# Patient Record
Sex: FEMALE | Race: WHITE | URBAN - METROPOLITAN AREA
[De-identification: names, ages, dates, MRNs, and addresses within clinical notes are randomized per-mention and may not be internally consistent; named-entity substitution may affect disease eponyms.]

---

## 2018-01-11 ENCOUNTER — HOSPITAL ENCOUNTER (OUTPATIENT)
Dept: LAB | Age: 23
Discharge: HOME | End: 2018-01-11

## 2018-01-11 ENCOUNTER — OFFICE VISIT (OUTPATIENT)
Dept: FAMILY MEDICINE CLINIC | Age: 23
End: 2018-01-11

## 2018-01-11 VITALS
SYSTOLIC BLOOD PRESSURE: 119 MMHG | HEIGHT: 61 IN | BODY MASS INDEX: 31.15 KG/M2 | TEMPERATURE: 98.3 F | WEIGHT: 165 LBS | HEART RATE: 76 BPM | DIASTOLIC BLOOD PRESSURE: 77 MMHG

## 2018-01-11 DIAGNOSIS — N91.1 SECONDARY AMENORRHEA: ICD-10-CM

## 2018-01-11 DIAGNOSIS — L83 AN (ACANTHOSIS NIGRICANS): ICD-10-CM

## 2018-01-11 DIAGNOSIS — E66.3 OVERWEIGHT: ICD-10-CM

## 2018-01-11 DIAGNOSIS — N30.01 ACUTE CYSTITIS WITH HEMATURIA: ICD-10-CM

## 2018-01-11 DIAGNOSIS — Z13.9 ENCOUNTER FOR SCREENING: Primary | ICD-10-CM

## 2018-01-11 DIAGNOSIS — Z97.5 IUD (INTRAUTERINE DEVICE) IN PLACE: ICD-10-CM

## 2018-01-11 DIAGNOSIS — Z23 ENCOUNTER FOR IMMUNIZATION: ICD-10-CM

## 2018-01-11 LAB
BILIRUB UR QL STRIP: NEGATIVE
EST. AVERAGE GLUCOSE BLD GHB EST-MCNC: 105 MG/DL
GLUCOSE UR-MCNC: NEGATIVE MG/DL
HBA1C MFR BLD: 5.3 % (ref 4.2–6.3)
HGB BLD-MCNC: 12.4 G/DL
KETONES P FAST UR STRIP-MCNC: NEGATIVE MG/DL
PH UR STRIP: 6.5 [PH] (ref 4.6–8)
PROT UR QL STRIP: NORMAL
SP GR UR STRIP: 1.01 (ref 1–1.03)
TSH SERPL DL<=0.05 MIU/L-ACNC: 2.21 UIU/ML (ref 0.36–3.74)
UA UROBILINOGEN AMB POC: NORMAL (ref 0.2–1)
URINALYSIS CLARITY POC: NORMAL
URINALYSIS COLOR POC: NORMAL
URINE BLOOD POC: NORMAL
URINE LEUKOCYTES POC: NORMAL
URINE NITRITES POC: NEGATIVE

## 2018-01-11 PROCEDURE — 87077 CULTURE AEROBIC IDENTIFY: CPT | Performed by: PHYSICIAN ASSISTANT

## 2018-01-11 PROCEDURE — 82627 DEHYDROEPIANDROSTERONE: CPT | Performed by: PHYSICIAN ASSISTANT

## 2018-01-11 PROCEDURE — 83036 HEMOGLOBIN GLYCOSYLATED A1C: CPT | Performed by: PHYSICIAN ASSISTANT

## 2018-01-11 PROCEDURE — 87086 URINE CULTURE/COLONY COUNT: CPT | Performed by: PHYSICIAN ASSISTANT

## 2018-01-11 PROCEDURE — 87186 SC STD MICRODIL/AGAR DIL: CPT | Performed by: PHYSICIAN ASSISTANT

## 2018-01-11 PROCEDURE — 84146 ASSAY OF PROLACTIN: CPT | Performed by: PHYSICIAN ASSISTANT

## 2018-01-11 PROCEDURE — 84443 ASSAY THYROID STIM HORMONE: CPT | Performed by: PHYSICIAN ASSISTANT

## 2018-01-11 RX ORDER — CEPHALEXIN 500 MG/1
500 CAPSULE ORAL 3 TIMES DAILY
Qty: 21 CAP | Refills: 0 | Status: SHIPPED | OUTPATIENT
Start: 2018-01-11 | End: 2020-04-20 | Stop reason: ALTCHOICE

## 2018-01-11 NOTE — PROGRESS NOTES
At discharge station AVS was printed and reviewed with pt with David Vides as . Reviewed rx script for  Told pt rx should be ready for  at pharmacy in approximately 2 hrs. Gave vaccine per protocol. Documented in [address]. Gave patient VIS information sheet and reviewed instructions regarding possible adverse side effects and allergic reactions. No adverse reaction noted at time of discharge.  Myles Hickman RN

## 2018-01-11 NOTE — PROGRESS NOTES
Results for orders placed or performed in visit on 01/11/18   AMB POC HEMOGLOBIN (HGB)   Result Value Ref Range    Hemoglobin (POC) 12.4    AMB POC URINALYSIS DIP STICK MANUAL W/O MICRO   Result Value Ref Range    Color (UA POC) Ev     Clarity (UA POC) Cloudy     Glucose (UA POC) Negative Negative    Bilirubin (UA POC) Negative Negative    Ketones (UA POC) Negative Negative    Specific gravity (UA POC) 1.015 1.001 - 1.035    Blood (UA POC) 4+ Negative    pH (UA POC) 6.5 4.6 - 8.0    Protein (UA POC) 2+ Negative    Urobilinogen (UA POC) normal 0.2 - 1    Nitrites (UA POC) Negative Negative    Leukocyte esterase (UA POC) 1+ Negative

## 2018-01-11 NOTE — PATIENT INSTRUCTIONS
Amenorrea secundaria: Instrucciones de cuidado - [ Secondary Amenorrhea: Care Instructions ]  Instrucciones de cuidado    La amenorrea es la falta de períodos menstruales. [address] tipos. La amenorrea primaria significa que nunca ha tenido períodos menstruales. La amenorrea secundaria significa que ha tenido períodos, y luego se interrumpen, especialmente mady más de 3 meses. Aun si no tiene períodos, todavía podría quedar embarazada. Es posible que no sepa por qué se interrumpieron nisha períodos. Las causas posibles incluyen el [place], los cambios hormonales y adelgazar o subir mucho de peso rápidamente. Algunos medicamentos y el estrés también podrían causarla. Participar activamente en deportes de resistencia también puede hacer que no tenga un período o que deje de [place]. Es posible que las atletas traten de bajar de peso o mantenerlo de Kristian vasquez. Estas incluyen hacer dietas demasiado estrictas o tener comilonas seguidas de purgas. Maxim hacer estas cosas puede conducir a trastornos de la alimentación, Rodney Lang y osteoporosis. Si hace menos ejercicio o sube un poco de peso, es probable que nisha períodos se restablezcan. Montelongo médico puede solicitar pruebas para determinar por qué se arredondo detenido nisha períodos. Montelongo médico puede darle la hormona progestina. Puede hacer que tenga montelongo período. Hable con montelongo médico si no tiene un período menstrual mady 3 meses o más. Pasar mucho tiempo sin tener el período puede aumentar las probabilidades de tener cáncer en el revestimiento del útero más adelante. La atención de seguimiento es marko parte clave de montelongo tratamiento y seguridad. Asegúrese de hacer y acudir a todas las citas, y llame a montelongo médico si está teniendo problemas. También es marko buena idea saber los resultados de los exámenes y mantener marko lista de los medicamentos que blue. ¿Cómo puede cuidarse en el hogar?   · Lleve marko Graciella Howard y saludable que contenga frutas, verduras, granos enteros, proteínas y productos lácteos bajos en grasa. · Davis ejercicio suave, a menos que stuart médico le indique que no lo davis. · Use un anticonceptivo si no desea quedar embarazada. ¿Cuándo debes pedir ayuda? Presta especial atención a los cambios en tu chuck y asegúrate de comunicarte con tu médico si:  ? · Piensas que podrías estar embarazada. ? · Tienes sangrado abundante después de no jean pierre tenido tu período mady varios meses. ? · No has tenido tu período menstrual mady 3 meses. ¿Dónde puede encontrar más información en inglés? Shin Deras a http://pranay-esteban.[email or web address] Clotilde Carranza [id number] en la búsqueda para aprender más acerca de \"Amenorrea secundaria: Instrucciones de cuidado - [ Secondary Amenorrhea: Care Instructions ]. \"  Revisado: 13 octubre, 2016  Versión del contenido: 11.4  © 9513-3611 Healthwise, Incorporated. Las instrucciones de cuidado fueron adaptadas bajo licencia por [employer] (which disclaims liability or warranty for this information). Si usted tiene [place] afección médica o sobre estas instrucciones, siempre pregunte a stuart profesional de chuck. Healthwise, Incorporated niega toda garantía o responsabilidad por stuart uso de esta información. Infección urinaria en las mujeres: Instrucciones de cuidado - [ Urinary Tract Infection in Women: Care Instructions ]  Instrucciones de cuidado    Marko infección urinaria (UTI, por insha siglas en inglés) es un término general que hace referencia a marko infección que se produce en cualquier parte entre los riñones y la uretra (conducto por el cual se expulsa la orina). La mayoría de las UTI son infecciones de la vejiga. Con frecuencia, causan dolor o ardor al [facility]. Las UTI son causadas por bacterias y pueden curarse con antibióticos. Asegúrese de completar el tratamiento para que la infección desaparezca. La atención de seguimiento es marko parte clave de stuart tratamiento y seguridad.  Asegúrese de hacer y acudir a todas las citas, y llame a stuart médico si está teniendo problemas. También es marko buena idea saber los resultados de los exámenes y mantener marko lista de los medicamentos que blue. ¿Cómo puede cuidarse en el hogar? · [address] No deje de tomarlos por el hecho de sentirse mejor. Debe belen todos los antibióticos hasta terminarlos. · Marry los próximos christopher o [address], kenia mayor cantidad de [place] y otros líquidos. [place] puede ayudar a eliminar las bacterias que provocan la infección. (Si tiene marko enfermedad de los riñones, el corazón o el hígado y tiene que Dejah's líquidos, hable con stuart médico antes de aumentar stuart consumo). · Evite las bebidas gaseosas o con cafeína. Pueden irritar la vejiga. · Orine con frecuencia. Trate de vaciar la vejiga cada vez que orine. · Para aliviar el dolor, tome un baño caliente o colóquese marko almohadilla térmica a baja temperatura sobre la parte baja del abdomen o la caprice genital. Nunca se duerma mientras [place] marko almohadilla térmica. Para prevenir las infecciones urinarias  · [place] abundante agua todos los días. [place] la ayuda a orinar con frecuencia, lo que elimina las bacterias de stuart organismo. (Si tiene marko enfermedad de los riñones, el corazón o el hígado y tiene que Dejah's líquidos, hable con stuart médico antes de aumentar stuart consumo). · Orine cuando necesite hacerlo. · Orine inmediatamente después de jean pierre tenido [employer]. · Cámbiese las toallas sanitarias con frecuencia. · Evite el uso de lavados vaginales, los jasper de burbujas, los [place] de higiene femenina y otros productos para la higiene femenina que contengan desodorantes. · Después de ir al baño, límpiese de adelante hacia atrás. ¿Cuándo debes pedir ayuda? Llama a tu médico ahora mismo o busca atención médica inmediata si:  ? · Aparecen síntomas carmen fiebre, escalofríos, náuseas o vómitos por Vero Petty, o empeoran.    ? · Te empieza a doler la espalda, heath debajo de la caja torácica. A esto se le llama dolor en el flanco.   ? · Aparece paul o pus en la orina. ? · Tienes problemas con los antibióticos. ?Presta especial atención a los cambios en tu chuck y asegúrate de comunicarte con tu médico si:  ? · No mejoras después de jean pierre tomado un antibiótico mady 2 días. ? · Los síntomas desaparecen y Luda Mcne[place]. ¿Dónde puede encontrar más información en inglés? Juan Carlos Hernandez a http://pranay-esteban.[email or web address] Andreea Knife [id number] en la búsqueda para aprender más acerca de \"Infección urinaria en las mujeres: Instrucciones de cuidado - [ Urinary Tract Infection in Women: Care Instructions ]. \"  Revisado: 12 dang, 2017  Versión del contenido: 11.4  © 1458-5833 Healthwise, Incorporated. Las instrucciones de cuidado fueron adaptadas bajo licencia por [employer] (which disclaims liability or warranty for this information). Si usted tiene [place] afección médica o sobre estas instrucciones, siempre pregunte a montelongo profesional de chuck. Healthwise, Incorporated niega toda garantía o responsabilidad por montelongo uso de esta información. Cómo comenzar un plan para bajar de peso: Instrucciones de cuidado - [ Starting a Weight Loss Plan: Care Instructions ]  Instrucciones de cuidado    Si está pensando en adelgazar, puede que le resulte difícil saber por dónde empezar. Montelongo médico puede ayudarle a preparar un plan para bajar de peso que se ajuste mejor a nisha necesidades. Es posible que prefiera belen marko clase de nutrición o ejercicio, o unirse a un brandan de [de-identified] para adelgazar. Si tiene preguntas sobre cómo cambiar nisha hábitos alimenticios o rutina de ejercicio, pregúntele a montelongo médico sobre la posibilidad de consultar a un dietista registrado o a un especialista en ejercicio. Bajar de peso puede ser un gran desafío. No tiene que hacer grandes cambios de repente. Jarvis Colon y Sukh.  Cuando esos cambios se conviertan en un hábito, incorpore algunos [employer]. Si eloise que no está listo para hacer cambios en daniel momento, escoja marko fecha en el futuro. Davis marko storm con stuart médico para determinar si es apropiado que comience un plan para bajar de [place]. La atención de seguimiento es marko parte clave de stuart tratamiento y seguridad. Asegúrese de hacer y acudir a todas las citas, y llame a stuart médico si está teniendo problemas. También es marko buena idea saber los resultados de los exámenes y mantener marko lista de los medicamentos que blue. ¿Cómo puede cuidarse en el hogar? · Establezca metas realistas. Muchas personas esperan perder más peso del que es probable. Perder el 5% a 10% del peso corporal podría ser suficiente para mejorar stuart chuck. · Davis que stuart ritu y nisha amigos se involucren para brindarle apoyo. Hable con ellos sobre las razones por las que Ed Burows y [address] Pueden ayudarle si participan en nisha rutinas de ejercicio y comen con usted, aunque es posible que coman algo diferente. · Busque lo que funcione mejor para usted. Si no tiene tiempo o no le gusta cocinar, un programa que ofrezca barras o batidos carmen sustitutos de comida podría ser el más adecuado para usted. Maxim si le gusta cocinar, lo mejor puede ser un plan que incluya menús y recetas diarios. · Pregúntele a stuart médico acerca de otros profesionales de la chuck que puedan ayudarle a alcanzar nisha objetivos para bajar de [place]. ¨ Un dietista puede ayudarle a introducir cambios saludables en stuart dieta. ¨ Un especialista en ejercicio o entrenador personal pueden ayudarle a desarrollar un programa de ejercicio Lafonda Lesch y hank. ¨ Un consejero o psiquiatra puede ayudarle a lidiar con la depresión, la ansiedad u otros problemas familiares que puedan interponerse en stuart propósito para adelgazar. · Considere unirse a un brandan de [de-identified] de personas que tratan de adelgazar. Stuart médico puede recomendarle grupos de apoyo en stuart localidad.   ¿Dónde puede encontrar más información en inglés? Shin Deras a http://pranay-esteban.info/. Clotilde Jamesy [id number] en la búsqueda para aprender más acerca de \"Cómo comenzar un plan para bajar de peso: Instrucciones de cuidado - [ Starting a Weight Loss Plan: Care Instructions ]. \"  Revisado: 13 octubre, 2016  Versión del contenido: 11.4  © 2492-3353 Healthwise, Incorporated. Las instrucciones de cuidado fueron adaptadas bajo licencia por [employer] (which disclaims liability or warranty for this information). Si usted tiene [place] afección médica o sobre estas instrucciones, siempre pregunte a stuart profesional de chuck. Healthwise, Incorporated niega toda garantía o responsabilidad por stuart uso de esta información.

## 2018-01-11 NOTE — PROGRESS NOTES
Assessment/Plan:    Diagnoses and all orders for this visit:    1. Encounter for screening  -     AMB POC HEMOGLOBIN (HGB)  -     AMB POC URINALYSIS DIP STICK MANUAL W/O MICRO    2. Acute cystitis with hematuria  -     cephALEXin (KEFLEX) 500 mg capsule; Take 1 Cap by mouth three (3) times daily. [place] 1 pastilla 3 veces al duncan por stuart orinar  -     CULTURE, URINE; Future    3. Overweight  -     HEMOGLOBIN A1C WITH EAG; Future  -     TSH 3RD GENERATION; Future  -     REFERRAL TO NUTRITION    4. Secondary amenorrhea  -     HEMOGLOBIN A1C WITH EAG; Future  -     TSH 3RD GENERATION; Future  -     PROLACTIN; Future  -     DHEA SULFATE; Future    5. IUD (intrauterine device) in place    6. AN (acanthosis nigricans)  -     HEMOGLOBIN A1C WITH EAG; Future    35 lb wt gain may be contributing to her amenorrhea  No hx of GDM but mom has DM and she has distinct acanthosis nigricans  Encouraged lifestyle changes immediately, will call if results abnl      Follow-up Disposition:  Return in about 4 weeks (around 2/8/2018). RACHEL Mensah expressed understanding of this plan. An AVS was printed and given to the patient.      ----------------------------------------------------------------------    Chief Complaint   Patient presents with    Headache     headaches 3-4x a week since 8 months ago, feeling dizzy as well    Vaginal Bleeding     spotting since placement of 10 year/copper IUD, in Dec. 2016    Urinary Burning     burning upon urination started about 6 months ago, also complaining of \"bad smell\"       History of Present Illness:  New pt here for 1. No period since 2016. She had a baby in 10/16. She is not breastfeeding. She delivered at [facility] and then had a copper T IUD (which does not have embedded hormones like a mirena, unless she is confused about the type of IUD. She does report that it is good for 10 years which is consistent with the copper T).  She has gained 35 lbs since pre-pregnancy weight of 130lbs. She has developed hair growth of dark, thick hair on her nipples/chest and face that is new since she started to gain weight. 2. Stress of being home w/out a car and having few outlets - she has 2 children here 3 and 2 yo. She prefers to work outside of the house. Her 10 yo is in another country and she feels stressed about this. Her  is supportive and there is no DV  3. Dysuria with burning with urination and no fever on/off for 6 months. Today, she has burning     No past medical history on file. Current Outpatient Prescriptions   Medication Sig Dispense Refill    cephALEXin (KEFLEX) 500 mg capsule Take 1 Cap by mouth three (3) times daily. [place] 1 pastilla 3 veces al duncan por stuart orinar 21 Cap 0       No Known Allergies    Social History   Substance Use Topics    Smoking status: Never Smoker    Smokeless tobacco: Never Used    Alcohol use No       No family history on file.     Physical Exam:     Visit Vitals    /77 (BP 1 Location: Right arm, BP Patient Position: Sitting)    Pulse 76    Temp 98.3 °F (36.8 °C) (Oral)    Ht 5' 0.63\" (1.54 m)    Wt 165 lb (74.8 kg)    LMP 12/01/2016  Comment: SPOTTING SINCE PLACEMENT OF IUD    BMI 31.56 kg/m2       A&Ox3  WDWN NAD  Respirations normal and non labored  Results for orders placed or performed in visit on 01/11/18   AMB POC URINALYSIS DIP STICK MANUAL W/O MICRO     Status: None   Result Value Ref Range Status    Color (UA POC) Ev  Final    Clarity (UA POC) Cloudy  Final    Glucose (UA POC) Negative Negative Final    Bilirubin (UA POC) Negative Negative Final    Ketones (UA POC) Negative Negative Final    Specific gravity (UA POC) 1.015 1.001 - 1.035 Final    Blood (UA POC) 4+ Negative Final    pH (UA POC) 6.5 4.6 - 8.0 Final    Protein (UA POC) 2+ Negative Final    Urobilinogen (UA POC) normal 0.2 - 1 Final    Nitrites (UA POC) Negative Negative Final    Leukocyte esterase (UA POC) 1+ Negative Final

## 2018-01-11 NOTE — MR AVS SNAPSHOT
Visit Information Js Anderson y Irsihcorona Personal Médico Departamento Teléfono del Dep. Número de visita 1/11/2018 10:00 AM Martina Thurman 104, 1554 Surgeons  [id number] Follow-up Instructions Return in about 4 weeks (around 2/8/2018). Upcoming Health Maintenance Date Due  
 HPV AGE 9Y-34Y (1 of 3 - Female 3 Dose Series) 10/2/2004 DTaP/Tdap/Td series (1 - Tdap) 10/2/2014 PAP AKA CERVICAL CYTOLOGY 10/2/2014 Influenza Age 5 to Adult 8/1/2017 Alergias  Review Complete El: 1/11/2018 Por: Regina Ge RN  
 A partir del:  1/11/2018 No Known Allergies Vacunas actuales Starleen Gavia No hay ninguna vacuna archivada. No revisadas esta visita You Were Diagnosed With   
  
 Galo Madrigal Encounter for screening    -  Primary ICD-10-CM: Z13.9 ICD-9-CM: V82.9 Acute cystitis with hematuria     ICD-10-CM: N30.01 
ICD-9-CM: 595.0 Overweight     ICD-10-CM: C63.8 ICD-9-CM: 278.02 Secondary amenorrhea     ICD-10-CM: N91.1 ICD-9-CM: 626.0 IUD (intrauterine device) in place     ICD-10-CM: Z97.5 ICD-9-CM: V45.51   
 AN (acanthosis nigricans)     ICD-10-CM: L83 
ICD-9-CM: 701.2 Partes vitales PS Pulso Temperatura [place] ( percentil de crecimiento) Peso (percentil de crecimiento) LMP (última michael)  
 119/77 (BP 1 Location: Right arm, BP Patient Position: Sitting) 76 98.3 °F (36.8 °C) (Oral) 5' 0.63\" (1.54 m) 165 lb (74.8 kg) 12/01/2016 BMI [facility]) Estado obstétrico Estatus de tabaquísmo 31.56 kg/m2 IUD Never Smoker Historial de signos vitales BMI and BSA Data Body Mass Index Body Surface Area  
 31.56 kg/m 2 1.79 m 2 [facility] Name Phone [address], [address] [place] [facility] [phone] Montelongo lista de medicamentos actualizada Lista actualizada el: 1/11/18 12:03 PM.  Gloriajean Fast use montelongo lista de medicamentos más reciente. cephALEXin 500 mg capsule También conocido carmen:  Rolin Bonus Take 1 Cap by mouth three (3) times daily. Merkel 1 pastilla 3 veces al duncan por stuart orinar Recetas Enviado a la [place] Refills  
 cephALEXin (KEFLEX) 500 mg capsule 0 Sig: Take 1 Cap by mouth three (3) times daily. Merkel 1 pastilla 3 veces al duncan por stuart orinar Class: Normal  
 Pharmacy: [facility] [address], [address] [facility]: [phone] Route: Oral  
  
Hicimos lo siguiente AMB POC HEMOGLOBIN (HGB) [56975 CPT(R)] AMB POC URINALYSIS DIP STICK MANUAL W/O MICRO [15712 CPT(R)] REFERRAL TO NUTRITION [REF50 Custom] Instrucciones de seguimiento Return in about 4 weeks (around 2/8/2018). Informacion de [employer] de Referencia Referido por Referido a  
  
 [id number] CHANDAN ROBLES No disponible [facility]as Estado [facility] [facility]  
 1 New Request 1/11/18 1/11/19 Si stuart referencia tiene un estado de \"pending review\" o \"denied\" , informacion adicional sera enviada para apoyar el resultado de esta decision. Instrucciones para el Paciente Amenorrea secundaria: Instrucciones de cuidado - [ Secondary Amenorrhea: Care Instructions ] Instrucciones de cuidado La amenorrea es la falta de períodos menstruales. [address] tipos. La amenorrea primaria significa que nunca ha tenido períodos menstruales. La amenorrea secundaria significa que ha tenido períodos, y luego se interrumpen, especialmente mady más de 3 meses. Aun si no tiene períodos, todavía podría quedar embarazada. Es posible que no sepa por qué se interrumpieron nisha períodos. Las causas posibles incluyen el [place], los cambios hormonales y adelgazar o subir mucho de peso rápidamente. Algunos medicamentos y el estrés también podrían causarla.  
Participar activamente en deportes de resistencia también puede hacer que no tenga un período o que deje de [place]. Es posible que las atletas traten de bajar de peso o mantenerlo de Kristian vasquez. Estas incluyen hacer dietas demasiado estrictas o tener comilonas seguidas de purgas. Maxim hacer estas cosas puede conducir a trastornos de la alimentación, Dorthey Devine y osteoporosis. Si hace menos ejercicio o sube un poco de peso, es probable que nisha períodos se restablezcan. Montelongo médico puede solicitar pruebas para determinar por qué se arredondo detenido nisha períodos. Montelongo médico puede darle la hormona progestina. Puede hacer que tenga montelongo período. Hable con montelongo médico si no tiene un período menstrual mady 3 meses o más. Pasar mucho tiempo sin tener el período puede aumentar las probabilidades de tener cáncer en el revestimiento del útero más adelante. La atención de seguimiento es marko parte clave de montelongo tratamiento y seguridad. Asegúrese de hacer y acudir a todas las citas, y llame a montelongo médico si está teniendo problemas. También es marko buena idea saber los resultados de los exámenes y mantener marko lista de los medicamentos que blue. Cómo puede cuidarse en el hogar? · Lleve marko Arvis Prost y saludable que contenga frutas, verduras, granos enteros, proteínas y productos lácteos bajos en grasa. · Norm ejercicio suave, a menos que montelongo médico le indique que no lo norm. · Use un anticonceptivo si no desea quedar embarazada. Cuándo debes pedir ayuda? Presta especial atención a los cambios en tu chuck y asegúrate de comunicarte con tu médico si: 
? · Piensas que podrías estar embarazada. ? · Tienes sangrado abundante después de no jean pierre tenido tu período mady varios meses. ? · No has tenido tu período menstrual mady 3 meses. Dónde puede encontrar más información en inglés? Eliza Alvarez a [email or web address]  
Jean  H402 en la búsqueda para aprender más acerca de \"Amenorrea secundaria: Instrucciones de cuidado - [ Secondary Amenorrhea: Care Instructions ]. \" 
Revisado: 13 octubre, 2016 Versión del contenido: 11.4 © 9593-3218 Healthwise, Incorporated. Las instrucciones de cuidado fueron adaptadas bajo licencia por Good Help Connections (which disclaims liability or warranty for this information). Si usted tiene Scenic Roper afección médica o sobre estas instrucciones, siempre pregunte a stuart profesional de chuck. Healthwise, Incorporated niega toda garantía o responsabilidad por stuart uso de esta información. Infección urinaria en las mujeres: Instrucciones de cuidado - [ Urinary Tract Infection in Women: Care Instructions ] Instrucciones de cuidado Marko infección urinaria (UTI, por nisha siglas en inglés) es un término general que hace referencia a marko infección que se produce en cualquier parte entre los riñones y la uretra (conducto por el cual se expulsa la orina). La mayoría de las UTI son infecciones de la vejiga. Con frecuencia, causan dolor o ardor al [place]. Las UTI son causadas por bacterias y pueden curarse con antibióticos. Asegúrese de completar el tratamiento para que la infección desaparezca. La atención de seguimiento es marko parte clave de stuart tratamiento y seguridad. Asegúrese de hacer y acudir a todas las citas, y llame a stuart médico si está teniendo problemas. También es marko buena idea saber los resultados de los exámenes y mantener marko lista de los medicamentos que blue. Cómo puede cuidarse en el hogar? · [address] No deje de tomarlos por el hecho de sentirse mejor. Debe belen todos los antibióticos hasta terminarlos. · Marry los próximos christopher o [address], crystal mayor cantidad de [place] y otros líquidos. [place] puede ayudar a eliminar las bacterias que provocan la infección. (Si tiene marko enfermedad de los riñones, el corazón o el hígado y tiene que Weinert's líquidos, hable con stuart médico antes de aumentar stuart consumo). · Evite las bebidas gaseosas o con cafeína. Pueden irritar la vejiga. · Orine con frecuencia. Trate de vaciar la vejiga cada vez que orine. · Para aliviar el dolor, tome un baño caliente o colóquese marko almohadilla térmica a baja temperatura sobre la parte baja del abdomen o la caprice genital. Nunca se duerma mientras [place] marko almohadilla térmica. [address] infecciones urinarias · [place] abundante agua todos los días. [place] la ayuda a orinar con frecuencia, lo que elimina las bacterias de stuart organismo. (Si tiene marko enfermedad de los riñones, el corazón o el hígado y tiene que Dejah's líquidos, hable con stuart médico antes de aumentar stuart consumo). · Orine cuando necesite hacerlo. · Orine inmediatamente después de jean pierre tenido [employer]. · Cámbiese las toallas sanitarias con frecuencia. · Evite el uso de lavados vaginales, los jasper de burbujas, los [place]ter[place] de higiene femenina y otros productos para la higiene femenina que contengan desodorantes. · Después de ir al baño, límpiese de adelante hacia atrás. Cuándo debes pedir ayuda? Llama a tu médico ahora mismo o busca atención médica inmediata si: 
? · Aparecen síntomas carmen fiebre, escalofríos, náuseas o vómitos por Maria De Jesus Finders, o empeoran. ? · Te empieza a doler la espalda, heath debajo de la caja torácica. A esto se le llama dolor en el flanco.  
? · Aparece paul o pus en la orina. ? · Tienes problemas con los antibióticos. ?Presta especial atención a los cambios en tu chuck y asegúrate de comunicarte con tu médico si: 
? · No mejoras después de jean pierre tomado un antibiótico mady 2 días. ? · Los síntomas desaparecen y Ken Simpers. Dónde puede encontrar más información en inglés? Bernice Galeazzi a [email or web address] Rubye Matter H522 en la búsqueda para aprender más acerca de \"Infección urinaria en las mujeres: Instrucciones de cuidado - [ Urinary Tract Infection in Women: Care Instructions ]. \" Revisado: 12 [place], 2017 Versión del contenido: 11.4 © 1907-6535 Healthwise, Incorporated. Las instrucciones de cuidado fueron adaptadas bajo licencia por Good Help Connections (which disclaims liability or warranty for this information). Si usted tiene [place] afección médica o sobre estas instrucciones, siempre pregunte a stuart profesional de cuhck. Healthwise, Incorporated niega toda garantía o responsabilidad por stuart uso de esta información. Cómo comenzar un plan para bajar de peso: Instrucciones de cuidado - [ Starting a Weight Loss Plan: Care Instructions ] Instrucciones de cuidado Si está pensando en adelgazar, puede que le resulte difícil saber por dónde empezar. Stuart médico puede ayudarle a preparar un plan para bajar de peso que se ajuste mejor a nisha necesidades. Es posible que prefiera belen marko clase de nutrición o ejercicio, o unirse a un brandan de [de-identified] para adelgazar. Si tiene preguntas sobre cómo cambiar nisha hábitos alimenticios o rutina de ejercicio, pregúntele a stuart médico sobre la posibilidad de consultar a un dietista registrado o a un especialista en ejercicio. Bajar de peso puede ser un gran desafío. No tiene que hacer grandes cambios de repente. Alean Sharron y Sukh. Cuando esos cambios se conviertan en un hábito, incorpore algunos [employer]. Si eloise que no está listo para hacer cambios en daniel momento, escoja marko fecha en el futuro. Davis marko storm con stuart médico para determinar si es apropiado que comience un plan para bajar de [place]. La atención de seguimiento es marko parte clave de stuart tratamiento y seguridad. Asegúrese de hacer y acudir a todas las citas, y llame a stuart médico si está teniendo problemas. También es marko buena idea saber los resultados de los exámenes y mantener marko lista de los medicamentos que blue. Cómo puede cuidarse en el hogar? · Establezca metas realistas.  Muchas personas esperan perder [employer] del que es probable. Perder el 5% a 10% del peso corporal podría ser suficiente para mejorar stuart chuck. · Davis que stuart ritu y nisha amigos se involucren para brindarle apoyo. Hable con ellos sobre las razones por las que Abida Mariely y [address] Pueden ayudarle si participan en nisha rutinas de ejercicio y comen con usted, aunque es posible que coman algo diferente. · Busque lo que funcione mejor para usted. Si no tiene tiempo o no le gusta cocinar, un programa que ofrezca barras o batidos carmen sustitutos de comida podría ser el más adecuado para usted. Maxim si le gusta cocinar, lo mejor puede ser un plan que incluya menús y recetas diarios. · Pregúntele a stuart médico acerca de otros profesionales de la chuck que puedan ayudarle a alcanzar nisha objetivos para bajar de [place]. ¨ Un dietista puede ayudarle a introducir cambios saludables en stuart dieta. ¨ Un especialista en ejercicio o entrenador personal pueden ayudarle a desarrollar un programa de ejercicio Vamsi Gist y seguro. ¨ Un consejero o psiquiatra puede ayudarle a lidiar con la depresión, la ansiedad u otros problemas familiares que puedan interponerse en stuart propósito para adelgazar. · Considere unirse a un brandan de [de-identified] de personas que tratan de adelgazar. Stuart médico puede recomendarle grupos de apoyo en stuart localidad. Dónde puede encontrar más información en inglés? Jeffrey Wyman a [email or web address]-esteban.[email or web address] Carey Crane G744 en la búsqueda para aprender más acerca de \"Cómo comenzar un plan para bajar de peso: Instrucciones de cuidado - [ Starting a Weight Loss Plan: Care Instructions ]. \" 
Revisado: 13 octubre, 2016 Versión del contenido: 11.4 © 5089-6408 Healthwise, [employer]. Las instrucciones de cuidado fueron adaptadas bajo licencia por Good Help Connections (which disclaims liability or warranty for this information).  Si usted tiene [place] afección médica o sobre estas instrucciones, siempre pregunte a stuart profesional de chuck. [facility], Incorporated niega toda garantía o responsabilidad por stuart uso de esta información. Introducing [facility]! [facility] introduce portal paciente MyChart . Ahora se puede acceder a partes de stuart expediente médico, enviar por correo electrónico la oficina de stuart médico y solicitar renovaciones de medicamentos en línea. En stuart navegador de Internet , Jennifer Drilling a [email or web address] Norm clic en el usuario por Bernadette Radha? Saintjohnathan Hoe clic aquí en la sesión Mundo Honey. Verá la página de registro Constantine. Ingrese stuart código de [facility] avril y carmen aparece a continuación. Usted no tendrá que [employer] código después de jean pierre completado el proceso de registro . Si usted no se inscribe antes de la fecha de caducidad , debe solicitar un nuevo código. · MyChart Código de acceso : Lacey Watts Expires: 4/11/2018 12:03 PM 
 
Ingresa los últimos cuatro dígitos de stuart Número de Seguro Social ( xxxx ) y fecha de nacimiento ( dd / mm / aaaa ) carmen se indica y norm clic en Enviar. Usted será llevado a la siguiente página de registro . Crear un ID MyChart . Esta será stuart ID de inicio de sesión de MyChart y no puede ser [place] , por lo que pensar en marko que es Marisue Daubs y fácil de recordar . Crear marko contraseña MyChart . Usted puede cambiar stuart contraseña en cualquier momento . Ingrese stuart Password Reset de preguntas y Nicole . [place] se puede utilizar en un momento posterior si usted olvida stuart contraseña. Introduzca stuart dirección de correo electrónico . Radha Red recibirá marko notificación por correo electrónico cuando la nueva información está disponible en MyChart . Aaron Brilliant cljessica en Registrarse. Meryle Leach zenobia y descargar porciones de stuart expediente médico. 
Norm clic en el enlace de descarga del menú Resumen para descargar marko copia portátil de stuart información médica . Si tiene Bhupendra [employer] , por favor visite la sección de preguntas frecuentes del sitio web MyChart . Recuerde, MyChart NO es que se utilizará para las necesidades urgentes. Para emergencias médicas , llame al 911 . Ahora disponible en stuart iPhone y Android ! Por favor proporcione daniel resumen de la documentación de cuidado a stuart próximo proveedor. If you have any questions after today's visit, please call [phone].

## 2018-01-12 LAB — PROLACTIN SERPL-MCNC: 12.6 NG/ML

## 2018-01-13 LAB
BACTERIA SPEC CULT: ABNORMAL
CC UR VC: ABNORMAL
DHEA-S SERPL-MCNC: 182.1 UG/DL (ref 110–431.7)
SERVICE CMNT-IMP: ABNORMAL

## 2018-01-18 ENCOUNTER — OFFICE VISIT (OUTPATIENT)
Dept: FAMILY MEDICINE CLINIC | Age: 23
End: 2018-01-18

## 2018-01-18 ENCOUNTER — CLINICAL SUPPORT (OUTPATIENT)
Dept: FAMILY MEDICINE CLINIC | Age: 23
End: 2018-01-18

## 2018-01-18 DIAGNOSIS — Z71.3 DIETARY COUNSELING AND SURVEILLANCE: Primary | ICD-10-CM

## 2018-01-18 DIAGNOSIS — Z71.9 COUNSELED BY NURSE: Primary | ICD-10-CM

## 2018-01-18 NOTE — PROGRESS NOTES
The pt is here for NV for lab results. The only lab resulted at this time is the Urine Culture. The pt was told she does have a UTI, and the Antibiotic she is taking was the correct one for her to take. The pt was asked if she had gotten her rx from the Pharmacy for her UTI. She stated she had. The pt was told her labs appeared to be normal, but the provider had not been able to review them at this time. The provider is not at the clinic today. The pt was told after the provider reviewed them and if there was any abnormalities she would be contacted.  Saskia Vincent RN

## 2018-01-18 NOTE — PROGRESS NOTES
Ms. Yfn Mojica was referred to RD for wt loss nutrition education. Nerissa Martelllevi is interpreting for this appt. Current weight 165 lbs  BMI 31.5 classifying pt as obesity class 1  IBW per pt is 130 lbs; She has not made any changes to diet/lifestyle since meeting with provider  Ms. Yfn Mojica asks RD to prescribe a medication that will help with weight loss stating, \"whenever I diet my mind and my stomach tell me to eat more. \"  Not currently exercising. Meals eaten and prepared at home  24 hour recall  AM  Cereal, sweetened, bright colors  Milk, 2%  Water    Mid-day  Orange, 1  Banana, 1    PM  Egg, 3  Sauce, green made with tomato  Tortilla, corn-8 each  Water    RD discussed with pt that what we talk about is more lifestyle change and not a diet, that implies a beginning and an end. Choosing a variety whole foods and eating smaller portions should not make you feel deprived but rather stronger and give you more energy. Introduced the [employer] and emphasized that ideally 3 meals/day would mirror this plan, but asked if she could start with just one meal/day looking like the MyPlate example. Discussed different meal ideas that would fit into the model. RD suggested having a shake mid-day that is made with water or milk, 2 veggies and a fruit and maybe a handful of nuts. Pt indicated good understanding and willingness to try.    F/U in 1 month to check compliance and discuss adding in exercise

## 2018-03-21 ENCOUNTER — OFFICE VISIT (OUTPATIENT)
Dept: FAMILY MEDICINE CLINIC | Age: 23
End: 2018-03-21

## 2018-03-21 ENCOUNTER — HOSPITAL ENCOUNTER (OUTPATIENT)
Dept: LAB | Age: 23
Discharge: HOME | End: 2018-03-21

## 2018-03-21 VITALS
SYSTOLIC BLOOD PRESSURE: 120 MMHG | TEMPERATURE: 98 F | BODY MASS INDEX: 31.94 KG/M2 | DIASTOLIC BLOOD PRESSURE: 75 MMHG | HEART RATE: 82 BPM | WEIGHT: 167 LBS

## 2018-03-21 DIAGNOSIS — N93.8 DUB (DYSFUNCTIONAL UTERINE BLEEDING): Primary | ICD-10-CM

## 2018-03-21 DIAGNOSIS — Z01.419 ENCOUNTER FOR WELL WOMAN EXAM: ICD-10-CM

## 2018-03-21 DIAGNOSIS — B96.89 BV (BACTERIAL VAGINOSIS): ICD-10-CM

## 2018-03-21 DIAGNOSIS — Z30.431 ENCOUNTER FOR ROUTINE CHECKING OF INTRAUTERINE CONTRACEPTIVE DEVICE (IUD): ICD-10-CM

## 2018-03-21 DIAGNOSIS — N76.0 BV (BACTERIAL VAGINOSIS): ICD-10-CM

## 2018-03-21 PROCEDURE — 88142 CYTOPATH C/V THIN LAYER: CPT | Performed by: PHYSICIAN ASSISTANT

## 2018-03-21 RX ORDER — METRONIDAZOLE 500 MG/1
500 TABLET ORAL 2 TIMES DAILY
Qty: 14 TAB | Refills: 0 | Status: SHIPPED | OUTPATIENT
Start: 2018-03-21 | End: 2020-04-20 | Stop reason: ALTCHOICE

## 2018-03-21 NOTE — PROGRESS NOTES
Assessment/Plan:    Diagnoses and all orders for this visit:    1. DUB (dysfunctional uterine bleeding)    2. Encounter for routine checking of intrauterine contraceptive device (IUD)    3. Encounter for well woman exam  -     PAP, LB, RFX HPV OBUTT([id number]); Future- this is the wrong pap order, please switch it with PAP w/out HPV    4. BV (bacterial vaginosis)  -     metroNIDAZOLE (FLAGYL) 500 mg tablet; Take 1 Tab by mouth two (2) times a day. [place] 1 Nereida-RupaloRawendy [employer] veces al duncan        Follow-up Disposition:  Return in about 1 month (around 2018). Atiya Credit McFerren, PA-C Verner Deems expressed understanding of this plan. An AVS was printed and given to the patient.      ----------------------------------------------------------------------    Chief Complaint   Patient presents with    Well Woman       History of Present Illness:    22 yo   Here for abnormal menstrual bleeding since insertion of IUD in 10/16. She has had spotting daily. She needs to use a panty liner daily  She has no pain with intercourse or pelvic pain  The daily spotting is very inconvenient to her. She is not sure what form of IUD she has but it is good for 10 years she states  She has a 3 yo and a 3 yo, she is not desiring of another pregnancy anytime soon     No past medical history on file. Current Outpatient Prescriptions   Medication Sig Dispense Refill    metroNIDAZOLE (FLAGYL) 500 mg tablet Take 1 Tab by mouth two (2) times a day. [place] 1 pastilla dos veces al duncan 14 Tab 0    cephALEXin (KEFLEX) 500 mg capsule Take 1 Cap by mouth three (3) times daily. [place] 1 pastilla 3 veces al duncan por stuart orinar 21 Cap 0       No Known Allergies    Social History   Substance Use Topics    Smoking status: Never Smoker    Smokeless tobacco: Never Used    Alcohol use No       No family history on file.     Physical Exam:     Visit Vitals    /75 (BP 1 Location: Left arm, BP Patient Position: Sitting)    Pulse 82    Temp 98 °F (36.7 °C) (Oral)    Wt 167 lb (75.8 kg)    BMI 31.94 kg/m2       A&Ox3  WDWN NAD  Respirations normal and non labored  Pelvic exam- ext she has menstrual blood (not spotting but like a period), she has blood from os, she has + whiff test, IUD strings present.  Uterus and adnexal exam neg

## 2018-03-21 NOTE — PROGRESS NOTES
Patient seen for discharge with assistance from abril Joseph. We reviewed the AVS, prescription,pharmacy location and the printed Good Rx coupon for the prescription. Per the provider's instruction, the patient needs an [address] appointment for removal of her current IUD and placement of a Mirena IUD which needs to be ordered through PAP. The patient has been to registration to schedule a Financial Screening appointment for [address]. She was given a PAP application and understands to return it as soon as possible to a [facility] clinic. She stated that only her  is working and he is paid in cash. She understands that a notarized letter on letterhead from her 's employer that explains how much and how often he is paid is necessary for the PAP application.  Amrita Weldon RN

## 2018-03-21 NOTE — PATIENT INSTRUCTIONS
Vaginosis bacteriana: Instrucciones de cuidado - [ Bacterial Vaginosis: Care Instructions ]  Instrucciones de cuidado    La vaginosis bacteriana es un tipo de infección vaginal. Es causada por el crecimiento excesivo de ciertas bacterias que se encuentran normalmente en la vagina. Entre los síntomas se incluyen comezón, hinchazón, dolor al orinar o tener relaciones sexuales, y flujo grisáceo o amarillento con olor a pescado. No se considera marko infección que se transmita por contacto sexual.  Aunque los síntomas pueden ser molestos e incómodos, la vaginosis bacteriana no suele causar otros problemas de [facility]. Sin embargo, puede causar complicaciones si la tiene mientras está [place]. Si lazaro la infección podría desaparecer por sí kei, la mayoría de los médicos utilizan antibióticos para stuart tratamiento. Es posible que le hayan recetado pastillas o cremas vaginales. Con tratamiento, la vaginosis bacteriana suele desaparecer al cabo de 5 a 7 días. La atención de seguimiento es marko parte clave de stuart tratamiento y seguridad. Asegúrese de hacer y acudir a todas las citas, y llame a stuart médico si está teniendo problemas. También es marko buena idea saber los resultados de los exámenes y mantener marko lista de los medicamentos que blue. ¿Cómo puede cuidarse en el hogar? · [address] No deje de tomarlos por el hecho de sentirse mejor. Debe belen todos los antibióticos hasta terminarlos. · Si está tomando metronidazol (Flagyl), no coma ni crystal nada que contenga alcohol. · Siga utilizando los medicamentos aunque comience el período menstrual. Utilice toallas sanitarias en lugar de tampones mady el tiempo que utilice la crema vaginal o supositorios. Los tampones pueden absorber el medicamento. · Use ropa holgada de algodón. No utilice nylon ni otros materiales que conserven el calor corporal y la humedad cerca de la piel. · No se rasque.  Alivie la comezón con marko compresa fría o un baño frío. · No se lave la caprice vaginal más de marko vez al día. Use solo agua corriente o un [place] y sin perfume. No use lavados vaginales. ¿Cuándo debe pedir ayuda? Preste especial atención a los cambios en stuart chuck y asegúrese de comunicarse con stuart médico si:  ? · Tiene sangrado vaginal inesperado. ? · Tiene fiebre. ? · Tiene mayor dolor o dolor nuevo en la vagina o la pelvis. ? · No mejora después de 1 semana. ? · Justine síntomas regresan después de que termina justine medicamentos. ¿Dónde puede encontrar más información en inglés? Chloe Officer a [email or web address]-esteban.[email or web address] Dia Eduardo [id number] en la búsqueda para aprender más acerca de \"Vaginosis bacteriana: Instrucciones de cuidado - [ Bacterial Vaginosis: Care Instructions ]. \"  Revisado: 13 octubre, 2016  Versión del contenido: 11.4  © 1750-3484 Healthwise, Incorporated. Las instrucciones de cuidado fueron adaptadas bajo licencia por [employer] (which disclaims liability or warranty for this information). Si usted tiene [place] afección médica o sobre estas instrucciones, siempre pregunte a stuart profesional de chuck. Healthwise, Incorporated niega toda garantía o responsabilidad por stuart uso de esta información. Sangrado uterino anormal: Instrucciones de cuidado - [ Abnormal Uterine Bleeding: Care Instructions ]  Instrucciones de cuidado    El sangrado uterino anormal (AUB, por justine siglas en inglés) es un sangrado irregular del útero que dura más o es más intenso de lo normal o que no ocurre en el momento habitual para usted. A veces, se debe a cambios en los niveles hormonales. También puede estar causado por crecimientos en el útero, tales carmen fibromas o pólipos. A veces no se puede encontrar la causa. Podría tener mucho sangrado cuando no está esperando stuart período. Tsuart médico puede sugerirle marko prueba de embarazo si eloise que está [place].   Lamount Caper de seguimiento es marko parte clave de stuart tratamiento y seguridad. Asegúrese de hacer y acudir a todas las citas, y llame a stuart médico si está teniendo problemas. También es marko buena idea saber los resultados de los exámenes y mantener marko lista de los medicamentos que blue. ¿Cómo puede cuidarse en el hogar? · Sea darrian con los medicamentos. [place] los analgésicos (medicamentos para el dolor) exactamente carmen le fueron indicados. ¨ Si el médico le recetó un analgésico, tómelo según las indicaciones. ¨ Si no está tomando un analgésico recetado, pregúntele a stuart médico si puede belen christopher de [employer]. · Podría faltarle paradise por la pérdida de paul. Coma marko dieta equilibrada con alto contenido de paradise y vitamina C. Entre los alimentos ricos en paradise se encuentran las loyd aquino, los [place], los [place], los frijoles (habichuelas) y las verduras de hojas verdes. Pregúntele a stuart médico si necesita belen pastillas de paradise o un multivitamínico.  ¿Cuándo debe pedir ayuda? Llame al 911 en cualquier momento que considere que necesita atención de [place]. Por ejemplo, llame si:  ? · Se desmayó (perdió el conocimiento). ?Llame a stuart médico ahora mismo o busque atención médica inmediata si:  ? · Tiene dolor repentino e intenso en el abdomen o la pelvis. ? · Tiene sangrado vaginal intenso. Empapa nisha toallas sanitarias o tampones habituales cada hora mady 2 o más horas. ? · [employer] o aturdimiento, o que está a punto de [place]. ?Preste especial atención a los cambios en stuart chuck y asegúrese de comunicarse con stuart médico si:  ? · Tiene un dolor nuevo en el abdomen o la pelvis. ? · Tiene fiebre. ? · El sangrado empeora o dura más de 1 semana. ? · Piensa que podría estar embarazada. ¿Dónde puede encontrar más información en inglés? Darlene Huitron a [email or web address]-esteban.[email or web address]   Felipe Bergeron I295 en la búsqueda para aprender más acerca de \"Sangrado uterino anormal: Instrucciones de cuidado - [ Abnormal Uterine Bleeding: Care Instructions ].\"  Revisado: 13 octubre, 2016  Versión del contenido: 11.4  © 4464-0722 Healthwise, Incorporated. Las instrucciones de cuidado fueron adaptadas bajo licencia por [employer] (which disclaims liability or warranty for this information). Si usted tiene [place] afección médica o sobre estas instrucciones, siempre pregunte a stuart profesional de chuck. Healthwise, Incorporated niega toda garantía o responsabilidad por stuart uso de esta información.

## 2018-04-24 ENCOUNTER — OFFICE VISIT (OUTPATIENT)
Dept: FAMILY MEDICINE CLINIC | Age: 23
End: 2018-04-24

## 2018-04-24 VITALS
SYSTOLIC BLOOD PRESSURE: 112 MMHG | HEART RATE: 74 BPM | DIASTOLIC BLOOD PRESSURE: 80 MMHG | TEMPERATURE: 98.3 F | BODY MASS INDEX: 31.94 KG/M2 | WEIGHT: 167 LBS

## 2018-04-24 DIAGNOSIS — N93.8 DUB (DYSFUNCTIONAL UTERINE BLEEDING): Primary | ICD-10-CM

## 2018-04-24 NOTE — PROGRESS NOTES
Per provider, with assistance of , Leydi Christiansen, explained the Pap program to pt for the Mirena IUD. Pt will complete the application and bring a letter from her 's employer on letterhead and notarized stating her 's income ( he is paid with personal checks, no pay stubs). She was advised to return the application as soon as possible. Pt referred to the registrar to make a procedure appt. Pt expressed understanding of the above information. Colleen Matthews.

## 2018-04-24 NOTE — MR AVS SNAPSHOT
[address] [id number] 
[phone] Patient: Sergio Thomas MRN: [id number] :1995 Visit Information Dimitri Meadows y Delbert Personal Médico Departamento Teléfono del Dep. Número de visita 2018  1:15 PM Chris Cody [address] [facility] [phone] Upcoming Health Maintenance Date Due  
 HPV Age 9Y-34Y (1 of 1 - Female 3 Dose Series) 10/2/2004 DTaP/Tdap/Td series (1 - Tdap) 10/2/2014 PAP AKA CERVICAL CYTOLOGY 3/21/2021 Alergias  Review Complete El: 2018 Por: Renetta Nunez A partir del:  2018 No Known Allergies Vacunas actuales Revisadas el:  2018 Fei Marquez Influenza Vaccine (Quad) PF 2018 No revisadas esta visita Partes vitales PS Pulso Temperatura Peso (percentil de crecimiento) BMI (IMC) Estado obstétrico  
 112/80 (BP 1 Location: Left arm, BP Patient Position: Sitting) 74 98.3 °F (36.8 °C) (Oral) 167 lb (75.8 kg) 31.94 kg/m2 IUD Estatus de tabaquísmo Never Smoker Historial de signos vitales BMI and BSA Data Body Mass Index Body Surface Area 31.94 kg/m 2 1.8 m 2 [facility] Name Phone [address] [facility] 96, [address] [phone] [place] lista de medicamentos actualizada Lista actualizada 18  1:40 PM.  Siempre use montelongo lista de medicamentos más reciente. cephALEXin 500 mg capsule También conocido carmen:  [place] Ekaterina Take 1 Cap by mouth three (3) times daily. Vanoss 1 pastilla 3 veces al duncan por montelongo orinar  
  
 metroNIDAZOLE 500 mg tablet También conocido carmen:  FLAGYL Take 1 Tab by mouth two (2) times a day. Vanoss 1 [employer] al duncan Introducing [facility]! [facility] introduce portal paciente MyChart .  Ahora se puede acceder a partes de stuart expediente médico, enviar por correo electrónico la oficina de stuart médico y solicitar renovaciones de medicamentos en línea. En stuart navegador de Internet , Nicolasa Morocho a [email or web address] Norm clic en el usuario por Ken Males? Carry Joanna clic aquí en la sesión Rosas Hoots. Verá la página de registro [place]. Ingrese stuart código de [employer] of Lizette avril y carmen aparece a continuación. Usted no tendrá que [employer] código después de jean pierre completado el proceso de registro . Si usted no se inscribe antes de la fecha de caducidad , debe solicitar un nuevo código. · MyChart Código de acceso : [id number] Expires: 7/23/2018  1:40 PM 
 
Ingresa los últimos cuatro dígitos de stuart Número de Seguro Social ( xxxx ) y fecha de nacimiento ( dd / mm / aaaa ) carmen se indica y norm clic en Enviar. Usted será llevado a la siguiente página de registro . Crear un ID MyChart . Esta será stuart ID de inicio de sesión de MyChart y no puede ser [place] , por lo que pensar en marko que es Charma Numbers y fácil de recordar . Crear marko contraseña MyChart . Usted puede cambiar stuart contraseña en cualquier momento . Ingrese stuart Password Reset de preguntas y Nicole . [place] se puede utilizar en un momento posterior si usted olvida stuart contraseña. Introduzca stuart dirección de correo electrónico . Rosario Boudreaux recibirá marko notificación por correo electrónico cuando la nueva información está disponible en MyChart . Colin Mendenhall clic en Registrarse. Tomasz Parry zenobia y descargar porciones de stuart expediente médico. 
Norm clic en el enlace de descarga del menú Resumen para descargar marko copia portátil de stuart información médica . Si tiene [employer] , por favor visite la sección de preguntas frecuentes del sitio web MyChart . Recuerde, MyChart NO es que se utilizará para las necesidades urgentes. Para emergencias médicas , llame al 911 . Ahora disponible en stuart iPhone y Android ! Por favor proporcione daniel resumen de la documentación de cuidado a stuart próximo proveedor. If you have any questions after today's visit, please call [phone].

## 2018-04-24 NOTE — PROGRESS NOTES
Coordination of Care  1. Have you been to the ER, urgent care clinic since your last visit? Hospitalized since your last visit? No    2. Have you seen or consulted any other health care providers outside of the [facility] since your last visit? Include any pap smears or colon screening. No    Does the patient need refills? NO    Learning Assessment Complete?  yes

## 2018-04-24 NOTE — PROGRESS NOTES
Pt here for IUD removal but she would like to have another IUD placed to control her abnormal bleeding  Plan to order mirena for pt, she needs to have a procedure appt  Will remove and insert the same day

## 2018-05-18 ENCOUNTER — OFFICE VISIT (OUTPATIENT)
Dept: FAMILY MEDICINE CLINIC | Age: 23
End: 2018-05-18

## 2018-05-18 VITALS
DIASTOLIC BLOOD PRESSURE: 70 MMHG | HEART RATE: 72 BPM | TEMPERATURE: 98 F | WEIGHT: 166 LBS | SYSTOLIC BLOOD PRESSURE: 117 MMHG | HEIGHT: 61 IN | BODY MASS INDEX: 31.34 KG/M2

## 2018-05-18 DIAGNOSIS — Z30.433 ENCOUNTER FOR IUD REMOVAL AND REINSERTION: Primary | ICD-10-CM

## 2018-05-18 RX ORDER — IBUPROFEN 600 MG/1
600 TABLET ORAL
Qty: 60 TAB | Refills: 0 | Status: SHIPPED | OUTPATIENT
Start: 2018-05-18

## 2018-05-18 NOTE — PROGRESS NOTES
Coordination of Care  1. Have you been to the ER, urgent care clinic since your last visit? Hospitalized since your last visit? No    2. Have you seen or consulted any other health care providers outside of the [address] [place] [address] since your last visit? Include any pap smears or colon screening. No    Does the patient need refills? NO    Learning Assessment Complete?  yes  Depression Screening complete in the past 12 months? yes

## 2018-05-18 NOTE — PROGRESS NOTES
Assessment/Plan:    Diagnoses and all orders for this visit:    1. Encounter for IUD removal and reinsertion  -     ibuprofen (IBU) 600 mg tablet; Take 1 Tab by mouth every six (6) hours as needed for Pain. aKlin stuart 6 horas si necissita        Follow-up Disposition:  Return in about 6 weeks (around 6/29/2018) for recheck IUD. RACHEL Matias expressed understanding of this plan. An AVS was printed and given to the patient.      ----------------------------------------------------------------------    Chief Complaint   Patient presents with    Procedure     IUD        History of Present Illness:  Pt here to have IUD changed, she has a 8 year Copper T and has been having DUB with the IUD and wishes to change it with mirena  Procedure:  After thorough explanation of procedure to remove her existing IUD and replace it with a mirena and including the risks of both of these procedures to cause injury to uterus, infection, perforation, pain the pt signs the consent and wishes to proceed. A time out was called with medical assistant in the room, confirming that this is the desire of the pt    Her uterus was found to be retroverted. IUD strings easily found, grasped and IUD removed    With sterile precautions, the cervix was cleansed 3 times with betadine swab  The uterus sounded to 6 cm  The mirena was inserted per package instructions  The strings were cut at 2 cm approx  The pt was on her period during the procedure  She tolerated it well and had a 0 pain rating after the procedure was completed      No past medical history on file. Current Outpatient Prescriptions   Medication Sig Dispense Refill    ibuprofen (IBU) 600 mg tablet Take 1 Tab by mouth every six (6) hours as needed for Pain. [place] 1 pastilla cada 6 horas si necissita 60 Tab 0    metroNIDAZOLE (FLAGYL) 500 mg tablet Take 1 Tab by mouth two (2) times a day.  [place] 1 lizet mccabe al duncan 14 Tab 0    cephALEXin (KEFLEX) 500 mg capsule Take 1 Cap by mouth three (3) times daily. [place] 1 pastilla 3 veces al duncan por stuart orinar 21 Cap 0       No Known Allergies    Social History   Substance Use Topics    Smoking status: Never Smoker    Smokeless tobacco: Never Used    Alcohol use No       No family history on file.     Physical Exam:     Visit Vitals    /70 (BP 1 Location: Left arm)    Pulse 72    Temp 98 °F (36.7 °C) (Oral)    Ht 5' 0.63\" (1.54 m)    Wt 166 lb (75.3 kg)    BMI 31.75 kg/m2       A&Ox3  WDWN NAD  Respirations normal and non labored

## 2018-05-18 NOTE — PATIENT INSTRUCTIONS
Inserción de un dispositivo intrauterino (DIU): Instrucciones de cuidado - [ Intrauterine Device (IUD) Insertion: Care Instructions ]  Instrucciones de cuidado    El dispositivo intrauterino (DIU) es un método anticonceptivo muy eficaz. Es un dispositivo pequeño de plástico, con forma de T, que contiene cobre u hormonas. El médico le inserta el DIU en el [place]. Un cordón de plástico sujeto al extremo del DIU cuelga a través del rachelle uterino dentro de la vagina. [facility]. El DIU de cobre es eficaz por hasta 10 años. El DIU hormonal es eficaz lazaro por 3 años o lazaro por 5 años, dependiendo del tipo de DIU que se use. El DIU hormonal también reduce el sangrado menstrual y los cólicos. Ambos tipos de DIU dañan o eliminan los espermatozoides del hombre. Por lo tanto, el óvulo de la marlee no es fecundado por un espermatozoide. El DIU también provoca cambios en el revestimiento del útero para que el óvulo no pueda anidar. Es probable que el DIU funcione mejor en las mujeres que ya arredondo estado embarazadas antes. Algunas mujeres que nunca arredondo estado embarazadas tienen más dificultades para retener el DIU en el [place]. También es posible que después de la inserción tengan más stephen y cólicos. La atención de seguimiento es marko parte clave de stuart tratamiento y seguridad. Asegúrese de hacer y acudir a todas las citas, y llame a stuart médico si está teniendo problemas. También es marko buena idea saber los resultados de los exámenes y mantener marko lista de los medicamentos que blue. ¿Cómo puede cuidarse en el hogar? · Es posible que sienta algunos cólicos leves y un sangrado ligero (manchado) mady 1 o 2 días. Para el dolor abdominal, use marko bolsa de [place] caliente o marko almohadilla térmica ajustada a baja temperatura. · [place] un analgésico (medicamento para el dolor) de venta alicia, carmen acetaminofén (Tylenol), ibuprofeno (Advil, Motrin) y naproxeno (Aleve), si es necesario.  Claire y siga todas las instrucciones de la etiqueta. · No tome dos o más analgésicos al [employer], a menos que el médico se lo haya indicado. Muchos analgésicos contienen acetaminofén, es decir, Tylenol. El exceso de acetaminofén (Tylenol) puede ser dañino. · Verifique el cordón del DIU después de cada período menstrual. Para verificarlo, introduzca un dedo en la vagina y toque el rachelle del Elif [place], que está al final de la vagina y es más helder que el lukas. Debería sentir el cordón evi de plástico saliendo de la abertura del rachelle uterino. Si no puede sentir el cordón, use otro método anticonceptivo y norm marko storm con el médico para que le revise el cordón. · Si se sale el DIU, guárdelo y llame a stuart médico. Asegúrese de utilizar otro método anticonceptivo mientras no tenga el DIU. · Use condones de látex para protegerse contra las infecciones de transmisión sexual (STI, por nisha siglas en inglés), carmen la gonorrea y la clamidia. El DIU no la protege contra las STI. Tener marko kei ping sexual (que no tenga STI y que no tenga relaciones sexuales con nadie más) es marko manera buena de evitar las STI. ¿Cuándo debe pedir ayuda? Llame al 911 en cualquier momento que considere que necesita atención de [place]. Por ejemplo, llame si:  ? · Se desmayó (perdió el conocimiento). ? · Tiene dolor repentino e intenso en el abdomen o la pelvis. ?Llame a stuart médico ahora mismo o busque atención médica inmediata si:  ? · Tiene un dolor nuevo en el abdomen o la pelvis. ? · Tiene sangrado vaginal intenso. [place] significa que empapa las toallas sanitarias o los tampones que suele usar cada hora, mady 2 o más horas. ? · [employer] o aturdimiento, o que está a punto de [place]. ? · Tiene fiebre y dolor pélvico o flujo vaginal.   ? · Tiene dolor pélvico que empeora. ?Preste especial atención a los cambios en stuart chuck y asegúrese de comunicarse con stuart médico si:  ? · No puede sentir el cordón o el DIU se sale.    ? · Siente el estómago revuelto, o vomita. ? · Piensa que podría estar embarazada. ¿Dónde puede encontrar más información en inglés? Annette Calvin a [email or web address]-esteban.info/. Neda Alexander [id number] en la búsqueda para aprender más acerca de \"Inserción de un dispositivo intrauterino (DIU): Instrucciones de cuidado - [ Intrauterine Device (IUD) Insertion: Care Instructions ]. \"  Revisado: 16 marzo, 2017  Versión del contenido: 11.4  © 8352-1353 Healthwise, Incorporated. Las instrucciones de cuidado fueron adaptadas bajo licencia por [employer] Connections (which disclaims liability or warranty for this information). Si usted tiene [place] afección médica o sobre estas instrucciones, siempre pregunte a stuart profesional de chuck. [employer] niega toda garantía o responsabilidad por stuart uso de esta información.

## 2018-05-18 NOTE — MR AVS SNAPSHOT
[address] [place] [address] [address] [id number] 
[phone] Patient: Glendon Lefort MRN: [id number] :[id number] Visit Information Chelsie Mandujano Personal Médico Departamento Teléfono del Dep. Número de visita 2018  1:35 PM Nehemiah Thurman 104, 513 [facility] [phone] Follow-up Instructions Return in about 6 weeks (around 2018) for recheck IUD. Upcoming Health Maintenance Date Due  
 HPV Age 9Y-34Y (1 of 1 - Female 3 Dose Series) 10/2/2004 DTaP/Tdap/Td series (1 - Tdap) 10/2/2014 Influenza Age 5 to Adult 2018 PAP AKA CERVICAL CYTOLOGY 3/21/2021 Alergias  Review Complete El: 2018 Por: Sandeep Buckley A partir del:  2018 No Known Allergies Vacunas actuales Revisadas el:  2018 [place] Kid Influenza Vaccine (Quad) PF 2018 No revisadas esta visita You Were Diagnosed With   
  
 Alejandroaaron Osei Encounter for IUD removal and reinsertion    -  Primary ICD-10-CM: N78.787 ICD-9-CM: V25.13 Partes vitales PS Pulso Temperatura [place] ( percentil de crecimiento) Peso (percentil de crecimiento) BMI (IMC)  
 117/70 (BP 1 Location: Left arm) 72 98 °F (36.7 °C) (Oral) 5' 0.63\" (1.54 m) 166 lb (75.3 kg) 31.75 kg/m2 Estado obstétrico Estatus de tabaquísmo IUD Never Smoker Historial de signos vitales BMI and BSA Data Body Mass Index Body Surface Area 31.75 kg/m 2 1.79 m 2 [facility] Name Phone [address] [place] [address], [address] [phone] [place] lista de medicamentos actualizada Betty Jiménez actualizada 18  1:56 PM.  Queta Owen use montelongo lista de medicamentos más reciente. cephALEXin 500 mg capsule También conocido carmen:  Rosa Maria Rumple Take 1 Cap by mouth three (3) times daily.  Lou[place] 1 pastilla 3 veces al duncan por stuart orinar  
  
 ibuprofen 600 mg tablet También conocido carmen:  IBU Take 1 Tab by mouth every six (6) hours as needed for Pain. [place] 1 pastilla cada 6 horas si necissita  
  
 metroNIDAZOLE 500 mg tablet También conocido carmen:  FLAGYL Take 1 Tab by mouth two (2) times a day. [place] 1 [employer] al duncan Recetas Enviado a la [place] Refills  
 ibuprofen (IBU) 600 mg tablet 0 Sig: Take 1 Tab by mouth every six (6) hours as needed for Pain. [place] 1 pastilla cada 6 horas si necissita Class: Normal  
 [facility] [address] [facility] [address] #: [phone] Route: Oral  
  
Instrucciones de seguimiento Return in about 6 weeks (around 6/29/2018) for recheck IUD. Instrucciones para el Paciente Inserción de un dispositivo intrauterino (DIU): Instrucciones de cuidado - [ Intrauterine Device (IUD) Insertion: Care Instructions ] Instrucciones de cuidado El dispositivo intrauterino (DIU) es un método anticonceptivo muy eficaz. Es un dispositivo pequeño de plástico, con forma de T, que contiene cobre u hormonas. El médico le inserta el DIU en el [place]. Un cordón de plástico sujeto al extremo del DIU cuelga a través del rachelle uterino dentro de la vagina. [facility]. El DIU de cobre es eficaz por hasta 10 años. El DIU hormonal es eficaz lazaro por 3 años o lazaro por 5 años, dependiendo del tipo de DIU que se use. El DIU hormonal también reduce el sangrado menstrual y los cólicos. Ambos tipos de DIU dañan o eliminan los espermatozoides del hombre. Por lo tanto, el óvulo de la marlee no es fecundado por un espermatozoide. El DIU también provoca cambios en el revestimiento del útero para que el óvulo no pueda anidar. Es probable que el DIU funcione mejor en las mujeres que ya arredondo estado embarazadas antes.  Algunas mujeres que nunca arredondo estado embarazadas tienen más dificultades para retener el DIU en el Margot Hodg[place]. También es posible que después de la inserción tengan más stephen y cólicos. La atención de seguimiento es marko parte clave de stuart tratamiento y seguridad. Asegúrese de hacer y acudir a todas las citas, y llame a stuart médico si está teniendo problemas. También es marko buena idea saber los resultados de los exámenes y mantener marko lista de los medicamentos que blue. Cómo puede cuidarse en el hogar? · Es posible que sienta algunos cólicos leves y un sangrado ligero (manchado) mady 1 o 2 días. Para el dolor abdominal, use marko bolsa de [place] caliente o marko almohadilla térmica ajustada a baja temperatura. · [place] un analgésico (medicamento para el dolor) de venta alicia, carmen acetaminofén (Tylenol), ibuprofeno (Advil, Motrin) y naproxeno (Aleve), si es necesario. Claire y siga todas las instrucciones de la [place]. · No tome dos o más analgésicos al [employer], a menos que el médico se lo haya indicado. Muchos analgésicos contienen acetaminofén, es decir, Tylenol. El exceso de acetaminofén (Tylenol) puede ser dañino. · Verifique el cordón del DIU después de cada período menstrual. Para verificarlo, introduzca un dedo en la vagina y toque el rachelle del [place], que está al final de la vagina y es más helder que el lukas. Debería sentir el cordón evi de plástico saliendo de la abertura del rachelle uterino. Si no puede sentir el cordón, use otro método anticonceptivo y norm marko storm con el médico para que le revise el cordón. · Si se sale el DIU, guárdelo y llame a stuart médico. Asegúrese de utilizar otro método anticonceptivo mientras no tenga el DIU. · Use condones de látex para protegerse contra las infecciones de transmisión sexual (STI, por nisha siglas en inglés), carmen la gonorrea y la clamidia. El DIU no la protege contra las STI. Tener marko kei ping sexual (que no tenga STI y que no tenga relaciones sexuales con nadie más) es marko manera buena de evitar las STI. Cuándo debe pedir ayuda? Llame al 911 en cualquier momento que considere que necesita atención de [place]. Por ejemplo, llame si: 
? · Se desmayó (perdió el conocimiento). ? · Tiene dolor repentino e intenso en el abdomen o la pelvis. ?Llame a stuart médico ahora mismo o busque atención médica inmediata si: 
? · Tiene un dolor nuevo en el abdomen o la pelvis. ? · Tiene sangrado vaginal intenso. [place] significa que empapa las toallas sanitarias o los tampones que suele usar cada hora, mady 2 o más horas. ? · [employer] o aturdimiento, o que está a punto de [place]. ? · Tiene fiebre y dolor pélvico o flujo vaginal.  
? · Tiene dolor pélvico que empeora. ?Preste especial atención a los cambios en stuart chuck y asegúrese de comunicarse con stuart médico si: 
? · No puede sentir el cordón o el DIU se sale. ? · Siente el estómago revuelto, o vomita. ? · Piensa que podría estar embarazada. Dónde puede encontrar más información en inglés? Sadaf Scrivener a [email or web address]-esteban.[email or web address] Gladys Brown R582 en la búsqueda para aprender más acerca de \"Inserción de un dispositivo intrauterino (DIU): Instrucciones de cuidado - [ Intrauterine Device (IUD) Insertion: Care Instructions ]. \" 
Revisado: 16 marzo, 2017 Versión del contenido: 11.4 © 2847-6942 Healthwise, Incorporated. Las instrucciones de cuidado fueron adaptadas bajo licencia por [employer] (which disclaims liability or warranty for this information). Si usted tiene [place] afección médica o sobre estas instrucciones, siempre pregunte a stuart profesional de chuck. Healthwise, Incorporated niega toda garantía o responsabilidad por stuart uso de esta información. Introducing [facility]! [facility] introduce portal paciente MyChart . Ahora se puede acceder a partes de stuart expediente médico, enviar por correo electrónico la oficina de stuart médico y solicitar renovaciones de medicamentos en línea. En stuart navegador de Internet , Blanche Dodd a [email or web address] Norm clic en el usuario por Vick [place]? Reagan Del Toro clic aquí en la sesión [facility]. Verá la página de registro [place]. Ingrese stuart código de [employer] avril y carmen aparece a continuación. Usted no tendrá que [employer] código después de jean pierre completado el proceso de registro . Si usted no se inscribe antes de la fecha de caducidad , debe solicitar un nuevo código. · MyChart Código de acceso : [id number] Expires: 7/23/2018  1:40 PM 
 
Ingresa los últimos cuatro dígitos de stuart Número de Seguro Social ( xxxx ) y fecha de nacimiento ( dd / mm / aaaa ) carmen se indica y norm clic en Enviar. Usted será llevado a la siguiente página de registro . Crear un ID MyChart . Esta será stuart ID de inicio de sesión de MyChart y no puede ser [place] , por lo que pensar en marko que es Lisa Hamming y fácil de recordar . Crear marko contraseña MyChart . Usted puede cambiar stuart contraseña en cualquier momento . Ingrese stuart Password Reset de preguntas y Nicole . [place] se puede utilizar en un momento posterior si usted olvida stuart contraseña. Introduzca stuart dirección de correo electrónico . Kiera Dior recibirá marko notificación por correo electrónico cuando la nueva información está disponible en MyChart . Maya monzonic en Registrarse. Ruchi Pu zenobia y descargar porciones de stuart expediente médico. 
Norm clic en el enlace de descarga del menú Resumen para descargar marko copia portátil de stuart información médica . Si tiene [employer] , por favor visite la sección de preguntas frecuentes del sitio web MyChart . Recuerde, MyChart NO es que se utilizará para las necesidades urgentes. Para emergencias médicas , llame al 911 . Ahora disponible en stuart iPhone y Android ! Por favor proporcione daniel resumen de la documentación de cuidado a stuart próximo proveedor. If you have any questions after today's visit, please call [phone].

## 2020-04-10 ENCOUNTER — TELEPHONE (OUTPATIENT)
Dept: FAMILY MEDICINE CLINIC | Age: 25
End: 2020-04-10

## 2020-04-10 NOTE — TELEPHONE ENCOUNTER
----- Message from Margareth Lo sent at 4/10/2020  9:18 AM EDT -----  Regarding: /Telephone  Contact: [phone]  Pt is requesting a call back regarding scheduling a face to face New OB PT Appt.

## 2020-04-13 ENCOUNTER — VIRTUAL VISIT (OUTPATIENT)
Dept: FAMILY MEDICINE CLINIC | Age: 25
End: 2020-04-13

## 2020-04-13 DIAGNOSIS — N92.6 MISSED MENSES: Primary | ICD-10-CM

## 2020-04-13 NOTE — PROGRESS NOTES
Pricilla Medici  30 y.o. female  1995  [address]  Susan [address] [facility]  [id number]    [phone] (home)      [address]:    Telephone Encounter  Areli Grayson MD       Encounter Date: 2020 at 1:01 PM    Consent:  She and/or the health care decision maker is aware that that she may receive a bill for this telephone service, depending on her insurance coverage, and has provided verbal consent to proceed: Yes     Called pt w/ Reina : [phone]    No chief complaint on file. History of Present Illness   [facility] 30 [facility] was evaluated by telephone. I communicated with the patient about missed menses. Pt states she is pregnant and found out at the end of January. LMP: 11/3/19; regular monthly cycles. GA 23w1d. ROSA: 20. She feels the baby move. Taking PNV. [id number]  - CS in 1st pregnancy due to hx pre-E - delivered in [facility] on 12. Term. Baby girl (7lb)  -  in 2nd pregnancy. Term. Baby boy (Abner Carlson). No complications - delivered in [facility] on 14.  -  in 3rd pregnancy. PTL at ~35w3d (10/16/16 delivery compared to 16 ROSA). Delivered in [facility]. Baby girl (Trena Kahn). Baby had 2 week NICU stay. No hx DM/GDM. No prenatal care yet. COVID screening: no fever/cough/SOB. No sick contacts/recent travel. No recent ER/hospital visits. Review of Systems   Review of Systems   Constitutional: Negative for chills and fever. Respiratory: Negative for cough and shortness of breath. Vitals/Objective:   General: Patient speaking in complete sentences without effort. Normal speech and cooperative. Due to this being a Virtual Check-in/Telephone evaluation, many elements of the physical examination are unable to be assessed.     Assessment and Plan:   Time-based coding, delete if not needed: I spent at least 25 minutes with this established patient, and >50% of the time was spent counseling and/or coordinating care regarding missed menses, pregnancy  Total Time: minutes: 21-30 minutes      There are no diagnoses linked to this encounter. We discussed the expected course, resolution and complications of the diagnosis(es) in detail. Medication risks, benefits, costs, interactions, and alternatives were discussed as indicated. I advised her to contact the office if her condition worsens, changes or fails to improve as anticipated. She expressed understanding with the diagnosis(es) and plan. Patient understands that this encounter was a temporary measure, and the importance of further follow up and examination was emphasized. Patient verbalized understanding. Patient informed to follow up: Monday 20th at 8:00AM    I affirm this is a Patient Initiated Episode with an Established Patient who has not had a related appointment within my department in the past 7 days or scheduled within the next 24 hours. Note: not billable if this call serves to triage the patient into an appointment for the relevant concern    Pt discussed w/ Dr. Tia Brice, Family Medicine Attending    Electronically Signed: Earl Del Real MD, Family Medicine Resident  Providers location when delivering service: [address] [place] [address]    CPT:  62759 (5-10 minutes)  02782 (11-20 minutes)  21  (21-30 minutes    No diagnosis found. Pursuant to the emergency declaration under the [facility], [facility] waiver authority and the [employer] and [employer] Act, this Virtual  Visit was conducted, with patient's consent, to reduce the patient's risk of exposure to COVID-19 and provide continuity of care for an established patient. History   Patients past medical, surgical and family histories were personally reviewed and updated. No past medical history on file. No past surgical history on file. No family history on file.   Social History     Socioeconomic History    Marital status: SINGLE     Spouse name: Not on file    Number of children: Not on file    Years of education: Not on file    Highest education level: Not on file   Occupational History    Not on file   Social Needs    Financial resource strain: Not on file    Food insecurity     Worry: Not on file     Inability: Not on file    Transportation needs     Medical: Not on file     Non-medical: Not on file   Tobacco Use    Smoking status: Never Smoker    Smokeless tobacco: Never Used   Substance and Sexual Activity    Alcohol use: No    Drug use: No    Sexual activity: Not on file   Lifestyle    Physical activity     Days per week: Not on file     Minutes per session: Not on file    Stress: Not on file   Relationships    Social connections     Talks on phone: Not on file     Gets together: Not on file     Attends Yazidi service: Not on file     Active member of club or organization: Not on file     Attends meetings of clubs or organizations: Not on file     Relationship status: Not on file    Intimate partner violence     Fear of current or ex partner: Not on file     Emotionally abused: Not on file     Physically abused: Not on file     Forced sexual activity: Not on file   Other Topics Concern    Not on file   Social History Narrative    Not on file            Current Medications/Allergies   Medications and Allergies reviewed:    Current Outpatient Medications   Medication Sig Dispense Refill    ibuprofen (IBU) 600 mg tablet Take 1 Tab by mouth every six (6) hours as needed for Pain. [place] 1 pastilla cada 6 horas si necissita 60 Tab 0    metroNIDAZOLE (FLAGYL) 500 mg tablet Take 1 Tab by mouth two (2) times a day. [place] 1 pastilla dos veces al duncan 14 Tab 0    cephALEXin (KEFLEX) 500 mg capsule Take 1 Cap by mouth three (3) times daily.  [place] 1 pastilla 3 veces al duncan por stuart orinar 21 Cap 0     No Known Allergies

## 2020-04-20 ENCOUNTER — TELEPHONE (OUTPATIENT)
Dept: FAMILY MEDICINE CLINIC | Age: 25
End: 2020-04-20

## 2020-04-20 ENCOUNTER — INITIAL PRENATAL (OUTPATIENT)
Dept: FAMILY MEDICINE CLINIC | Age: 25
End: 2020-04-20

## 2020-04-20 ENCOUNTER — HOSPITAL ENCOUNTER (OUTPATIENT)
Dept: LAB | Age: 25
Discharge: HOME | End: 2020-04-20

## 2020-04-20 ENCOUNTER — HOSPITAL ENCOUNTER (OUTPATIENT)
Dept: LAB | Age: 25
Discharge: HOME | End: 2020-04-20
Payer: SELF-PAY

## 2020-04-20 VITALS
TEMPERATURE: 98.3 F | WEIGHT: 156 LBS | RESPIRATION RATE: 16 BRPM | BODY MASS INDEX: 29.45 KG/M2 | OXYGEN SATURATION: 99 % | SYSTOLIC BLOOD PRESSURE: 107 MMHG | HEIGHT: 61 IN | DIASTOLIC BLOOD PRESSURE: 73 MMHG | HEART RATE: 92 BPM

## 2020-04-20 DIAGNOSIS — Z34.90 PRENATAL CARE, ANTEPARTUM: Primary | ICD-10-CM

## 2020-04-20 DIAGNOSIS — Z34.90 PRENATAL CARE, ANTEPARTUM: ICD-10-CM

## 2020-04-20 DIAGNOSIS — R00.2 PALPITATIONS: ICD-10-CM

## 2020-04-20 DIAGNOSIS — Z23 ENCOUNTER FOR IMMUNIZATION: ICD-10-CM

## 2020-04-20 DIAGNOSIS — Z87.59 HISTORY OF PRE-ECLAMPSIA: ICD-10-CM

## 2020-04-20 PROBLEM — T50.901A ACCIDENTAL MEDICATION ERROR: Status: ACTIVE | Noted: 2020-04-20

## 2020-04-20 LAB
ABO + RH BLD: NORMAL
ALBUMIN SERPL-MCNC: 3.3 G/DL (ref 3.5–5)
ALBUMIN/GLOB SERPL: 0.8 {RATIO} (ref 1.1–2.2)
ALP SERPL-CCNC: 54 U/L (ref 45–117)
ALT SERPL-CCNC: 15 U/L (ref 12–78)
ANION GAP SERPL CALC-SCNC: 11 MMOL/L (ref 5–15)
AST SERPL-CCNC: 17 U/L (ref 15–37)
BILIRUB SERPL-MCNC: 0.3 MG/DL (ref 0.2–1)
BILIRUB UR QL STRIP: NEGATIVE
BLOOD BANK CMNT PATIENT-IMP: NORMAL
BLOOD GROUP ANTIBODIES SERPL: NORMAL
BUN SERPL-MCNC: 9 MG/DL (ref 6–20)
BUN/CREAT SERPL: 20 (ref 12–20)
CALCIUM SERPL-MCNC: 9.1 MG/DL (ref 8.5–10.1)
CHLORIDE SERPL-SCNC: 103 MMOL/L (ref 97–108)
CO2 SERPL-SCNC: 22 MMOL/L (ref 21–32)
COMMENT, HOLDF: NORMAL
CREAT SERPL-MCNC: 0.45 MG/DL (ref 0.55–1.02)
CREAT UR-MCNC: 103 MG/DL
ERYTHROCYTE [DISTWIDTH] IN BLOOD BY AUTOMATED COUNT: 12.7 % (ref 11.5–14.5)
GLOBULIN SER CALC-MCNC: 4.2 G/DL (ref 2–4)
GLUCOSE SERPL-MCNC: 69 MG/DL (ref 65–100)
GLUCOSE UR-MCNC: NEGATIVE MG/DL
HBV SURFACE AG SER QL: 0.5 INDEX
HBV SURFACE AG SER QL: NEGATIVE
HCT VFR BLD AUTO: 34.2 % (ref 35–47)
HGB BLD-MCNC: 11.1 G/DL (ref 11.5–16)
KETONES P FAST UR STRIP-MCNC: NEGATIVE MG/DL
MCH RBC QN AUTO: 28.8 PG (ref 26–34)
MCHC RBC AUTO-ENTMCNC: 32.5 G/DL (ref 30–36.5)
MCV RBC AUTO: 88.8 FL (ref 80–99)
NRBC # BLD: 0 K/UL (ref 0–0.01)
NRBC BLD-RTO: 0 PER 100 WBC
PH UR STRIP: 6.5 [PH] (ref 4.6–8)
PLATELET # BLD AUTO: 144 K/UL (ref 150–400)
PMV BLD AUTO: 12.5 FL (ref 8.9–12.9)
POTASSIUM SERPL-SCNC: 3.8 MMOL/L (ref 3.5–5.1)
PROT SERPL-MCNC: 7.5 G/DL (ref 6.4–8.2)
PROT UR QL STRIP: NEGATIVE
PROT UR-MCNC: 21 MG/DL (ref 0–11.9)
PROT/CREAT UR-RTO: 0.2
RBC # BLD AUTO: 3.85 M/UL (ref 3.8–5.2)
RUBV IGG SER-IMP: REACTIVE
RUBV IGG SERPL IA-ACNC: 117 IU/ML
SAMPLES BEING HELD,HOLD: NORMAL
SODIUM SERPL-SCNC: 136 MMOL/L (ref 136–145)
SP GR UR STRIP: 1.02 (ref 1–1.03)
SPECIMEN EXP DATE BLD: NORMAL
TSH SERPL DL<=0.05 MIU/L-ACNC: 2.42 UIU/ML (ref 0.36–3.74)
UA UROBILINOGEN AMB POC: NORMAL (ref 0.2–1)
URINALYSIS CLARITY POC: NORMAL
URINALYSIS COLOR POC: YELLOW
URINE BLOOD POC: NEGATIVE
URINE LEUKOCYTES POC: NEGATIVE
URINE NITRITES POC: NEGATIVE
WBC # BLD AUTO: 9.8 K/UL (ref 3.6–11)

## 2020-04-20 PROCEDURE — 88175 CYTOPATH C/V AUTO FLUID REDO: CPT

## 2020-04-20 PROCEDURE — 87661 TRICHOMONAS VAGINALIS AMPLIF: CPT

## 2020-04-20 RX ORDER — ASPIRIN 81 MG/1
81 TABLET ORAL DAILY
Qty: 90 TAB | Refills: 1 | Status: SHIPPED | OUTPATIENT
Start: 2020-04-20

## 2020-04-20 NOTE — PROGRESS NOTES
Chief Complaint   Patient presents with    Initial Prenatal Visit     . No bleeding or contractions. States she has lost vaginal fluid. +FM. Subjective:   Maritza Yeh is a 30 y.o.  who is being seen today for her first obstetrical visit. Med[facility] : [id number]    This is not a planned pregnancy. She is at 24w2d gestation by LMP (19). ROSA: 20    See flow sheet for OB history. Fetal movement: present  LOF: none  Vaginal bleeding: none  Contractions: none  PNV: taking     History of GDM or DM? No  History of GHTN or HTN? No  History of pre-eclampsia? Yes; first pregnancy in     Pt has noted palpitations since the second month of her pregnancy. She feels that her heart is beating fast all day. Denies CP. No hx of palpitations prior to pregnancy. She states she occasionally feels lightheaded but otherwise denies any anxiety/tremor/change in bowel habits. Allergies- reviewed:   No Known Allergies    Medications- reviewed:   Current Outpatient Medications   Medication Sig    PNV No12-Iron-FA-DSS-OM-3 29 mg iron-1 mg -50 mg CPKD Take  by mouth.  aspirin delayed-release 81 mg tablet Take 1 Tab by mouth daily.  ibuprofen (IBU) 600 mg tablet Take 1 Tab by mouth every six (6) hours as needed for Pain. [place] 1 pastilla cada 6 horas si necissita     No current facility-administered medications for this visit. Past Medical History- reviewed:  No past medical history on file. Past Surgical History- reviewed:   No past surgical history on file.     Social History- reviewed:  Social History     Socioeconomic History    Marital status: SINGLE     Spouse name: Not on file    Number of children: Not on file    Years of education: Not on file    Highest education level: Not on file   Occupational History    Not on file   Social Needs    Financial resource strain: Not on file    Food insecurity     Worry: Not on file     Inability: Not on file   [place] Transportation needs     Medical: Not on file     Non-medical: Not on file   Tobacco Use    Smoking status: Never Smoker    Smokeless tobacco: Never Used   Substance and Sexual Activity    Alcohol use: No    Drug use: No    Sexual activity: Not on file   Lifestyle    Physical activity     Days per week: Not on file     Minutes per session: Not on file    Stress: Not on file   Relationships    Social connections     Talks on phone: Not on file     Gets together: Not on file     Attends Yazdanism service: Not on file     Active member of club or organization: Not on file     Attends meetings of clubs or organizations: Not on file     Relationship status: Not on file    Intimate partner violence     Fear of current or ex partner: Not on file     Emotionally abused: Not on file     Physically abused: Not on file     Forced sexual activity: Not on file   Other Topics Concern    Not on file   Social History Narrative    Not on file         Objective:     Visit Vitals  /73   Pulse 92   Temp 98.3 °F (36.8 °C) (Oral)   Resp 16   Ht 5' 0.63\" (1.54 m)   Wt 156 lb (70.8 kg)   LMP 11/02/2019 (Exact Date)   SpO2 99%   BMI 29.84 kg/m²       See physical exam on flowsheet  Pelvic exam chaperoned by Linda Monroy LPN    Labs:  Recent Results (from the past 12 hour(s))   AMB POC URINALYSIS DIP STICK AUTO W/O MICRO    Collection Time: 04/20/20  8:27 AM   Result Value Ref Range    Color (UA POC) Yellow     Clarity (UA POC) Slightly Cloudy     Glucose (UA POC) Negative Negative    Bilirubin (UA POC) Negative Negative    Ketones (UA POC) Negative Negative    Specific gravity (UA POC) 1.020 1.001 - 1.035    Blood (UA POC) Negative Negative    pH (UA POC) 6.5 4.6 - 8.0    Protein (UA POC) Negative Negative    Urobilinogen (UA POC) 0.2 mg/dL 0.2 - 1    Nitrites (UA POC) Negative Negative    Leukocyte esterase (UA POC) Negative Negative         Assessment and Plan:         ICD-10-CM ICD-9-CM    1.  Prenatal care, antepartum Z34.90 V22.1 AMB POC URINALYSIS DIP STICK AUTO W/O MICRO      CBC W/O DIFF      TYPE & SCREEN      HEMOGLOBIN FRACTIONATION      HEP B SURFACE AG      METABOLIC PANEL, COMPREHENSIVE      PROTEIN/CREATININE RATIO, URINE      HIV 1/2 AG/AB, 4TH GENERATION,W RFLX CONFIRM      RPR      VZV AB, IGG      RUBELLA AB, IGG      PAP IG, CT-NG-TV, RFX APTIMA HPV ASCUS ([id number])      CULTURE, URINE      TSH 3RD GENERATION      aspirin delayed-release 81 mg tablet   2. Palpitations R00.2 785.1 TSH 3RD GENERATION   3. History of pre-eclampsia Z87.59 V13.29 aspirin delayed-release 81 mg tablet       30 y.o.  24w2d ROSA 2020, by LMP here for initial OB visit     · IOB labs collected (CBC without diff, blood type & screen, rubella titer, varicella, HBsAg titer, RPR, HIV, Hgb fractionation, UA w/ cx, pap smear, pap w/ gonorrhea/chlamydia - reflex HPV)  · Intermittent Palpitations: improved in clinic after drinking water and pt reported resolution of sx (-->92)  · Will check TSH  · If recurrent/persistent by next visit, will check EKG as well  · Discussed recommended weight gain (25-35lb)  · Continue prenatal vitamins  · Discussed optional genetic screening - pt wishes to proceed w/ panorama and horizon. · Request for [facility] anatomy scan faxed; to confirm dating a the time as pt is late to prenatal care  · Scheduled anatomy scan for 20  · Follow up on 20   · Hx PTD: pt late to care and so lesia/cervical length screens cannot be utilized  · Will obtain records from [facility] release form signed  · Tdap given today to this pt in [facility] as it was ordered for a different pt. Will consider Tdap given and not order again at 28w. Pt informed of this medical error and reassured that this will not have negative effect on fetus. Pt verbalized understanding and did not have any further questions.       Orders Placed This Encounter    CULTURE, URINE     Standing Status:   Future     Standing Expiration Date:   4/21/2021    CBC W/O DIFF     Standing Status:   Future     Number of Occurrences:   1     Standing Expiration Date:   4/20/2021    HEMOGLOBIN FRACTIONATION     Standing Status:   Future     Number of Occurrences:   1     Standing Expiration Date:   10/20/2020    HEP B SURFACE AG     Standing Status:   Future     Number of Occurrences:   1     Standing Expiration Date:   29/20/6363    METABOLIC PANEL, COMPREHENSIVE     Standing Status:   Future     Number of Occurrences:   1     Standing Expiration Date:   4/20/2021    PROTEIN/CREATININE RATIO, URINE ([facility] Only)     Standing Status:   Future     Number of Occurrences:   1     Standing Expiration Date:   4/20/2021    HIV 1/2 AG/AB, 4TH GENERATION,W RFLX CONFIRM     Standing Status:   Future     Number of Occurrences:   1     Standing Expiration Date:   10/20/2020    RPR     Standing Status:   Future     Number of Occurrences:   1     Standing Expiration Date:   4/21/2021    VZV AB, IGG     Standing Status:   Future     Number of Occurrences:   1     Standing Expiration Date:   4/21/2021    RUBELLA AB, IGG     Standing Status:   Future     Number of Occurrences:   1     Standing Expiration Date:   10/20/2020    TSH 3RD GENERATION     Standing Status:   Future     Number of Occurrences:   1     Standing Expiration Date:   4/20/2021    AMB POC URINALYSIS DIP STICK AUTO W/O MICRO    TYPE & SCREEN     ENTER SURGERY DATE IF FOR PRE-OP TESTING. Standing Status:   Future     Number of Occurrences:   1     Standing Expiration Date:   10/20/2020     Order Specific Question:   Has patient been transfused or pregnant in the last 3 mos. ? Answer:   Unknown    PNV No12-Iron-FA-DSS-OM-3 29 mg iron-1 mg -50 mg CPKD     Sig: Take  by mouth.  aspirin delayed-release 81 mg tablet     Sig: Take 1 Tab by mouth daily.      Dispense:  90 Tab     Refill:  1    PAP IG, CT-NG-TV, RFX APTIMA HPV ASCUS ([id number])     Standing Status:   Future     Standing Expiration Date:   4/20/2021     Order Specific Question:   Pap Source? Answer:   Endocervical     Order Specific Question:   Total Hysterectomy? Answer:   No     Order Specific Question:   Supracervical Hysterectomy? Answer:   No     Order Specific Question:   Post Menopausal?     Answer:   No     Order Specific Question:   Hormone Therapy? Answer:   No     Order Specific Question:   IUD? Answer:   No     Order Specific Question:   Abnormal Bleeding? Answer:   No     Order Specific Question:   Pregnant     Answer:   Yes     Order Specific Question:   Post Partum? Answer:   No       I have discussed the diagnosis with the patient and the intended plan as seen in the above orders. The patient has received an after-visit summary and questions were answered concerning future plans. I have discussed medication side effects and warnings with the patient as well. Informed pt to return to the office or go to the ER if she experiences vaginal bleeding, vaginal discharge, leaking of fluid, pelvic cramping.       Pt discussed with Dr. Brooke Wolff (attending physician)    Dania Holley MD  Family Medicine Resident

## 2020-04-20 NOTE — PROGRESS NOTES
I reviewed with the resident the medical history and the resident's findings on the physical examination. I discussed with the resident the patient's diagnosis and concur with the plan. [address] @ 24w2d by LMP  1. IUP: IOB labs today, scheduling MFM anatomy and can confirm dates at that time  2. Hx PTD: at 35 weeks at [facility], requesting records but too late for [facility] or cervical length screens  3. Late to care: at 24+ weeks  4. Hx PreE: ASA  5. Hx CS with 2 subsequent : desires TOLAC   6.   Palpitations: will get EKG, check TSH

## 2020-04-20 NOTE — PATIENT INSTRUCTIONS
Semanas 22 a 26 de stuart embarazo: Instrucciones de cuidado Weeks 22 to 26 of Your Pregnancy: Care Instructions Instrucciones de cuidado Al comenzar el 7.º mes de stuart embarazo en la semana 32, los pulmones de stuart bebé se están volviendo más lisandro y se están preparando para respirar. Podría notar que stuart bebé responde al zay de stuart voz o la de stuart ping. También podría notar que stuart bebé se voltea y [place] menos de posición, y se retuerce o sacude más. Por lo general, las sacudidas indican que stuart bebé tiene hipo. Tener hipo es totalmente normal y dura poco tiempo. Colten vez sea buena idea pensar en asistir a marko clase de preparación para el parto. Daniel es también un buen momento para comenzar a pensar si desea que mady el parto le administren un analgésico (medicamento para el dolor). A la mayoría de las mujeres embarazadas les hacen pruebas para la diabetes gestacional entre las semanas 24 y 29. La diabetes gestacional ocurre cuando el nivel de azúcar en la paul es muy alto mady el [place]. La prueba es importante, porque usted puede tener diabetes gestacional y no saberlo. Maxim esta enfermedad puede causarle problemas a tsuart bebé. La atención de seguimiento es marko parte clave de stuart tratamiento y seguridad. Asegúrese de hacer y acudir a todas las citas, y llame a stuart médico si está teniendo problemas. También es marko buena idea saber los resultados de nisha exámenes y mantener marko lista de los medicamentos que blue. Cómo puede cuidarse en el hogar? Alivie las molestias de las patadas de stuart bebé · Cambie de posición. A veces, daniel cambio hace que stuart bebé también cambie de posición. · Respire hondo al mismo tiempo que levanta los brazos sobre stuart [place]. Después exhale a medida que baja los brazos. [place] los ejercicios de Kegel para evitar pérdidas de [facility] · Puede hacer los ejercicios de Kegel estando zamora o sentada. ? Apriete los mismos músculos que usaría para detener el chorro de [facility]. El abdomen y los muslos no deben moverse. ? Manténgalos apretados mady 3 segundos y, luego, relájelos otros 3 segundos. ? Empiece con 3 segundos. Igor Bower, añada 1 korin cada semana hasta que sea capaz de apretar mady 10 segundos. ? Repita el ejercicio entre 10 y 13 veces [address] [place] iván o más sesiones cada día. Alivie o reduzca la hinchazón de los pies, los tobillos, las more y los dedos · Si tiene los dedos hinchados, quítese los [place]. · No coma alimentos con mucha sal, carmen yamila fritas. · Coloque nisha pies sobre un banco o un sofá todo el tiempo que le sea posible. Duerma con almohadas debajo de los pies. · No se quede de pie mady mucho tiempo ni use calzado ajustado. · Use medias elásticas de soporte. Dónde puede encontrar más información en inglés? Seanona Solitario a http://pranay-esteban.info/ Escriba G264 en la búsqueda para aprender más acerca de \"Semanas 22 a 26 de stuart embarazo: Instrucciones de cuidado. \" Revisado: 29 [place], 2019Versión del contenido: 12.4 © 1926-8415 Healthwise, Incorporated. Las instrucciones de cuidado fueron adaptadas bajo licencia por [employer] (which disclaims liability or warranty for this information). Si usted tiene [place] afección médica o sobre estas instrucciones, siempre pregunte a stuart profesional de chuck. Healthwise, Incorporated niega toda garantía o responsabilidad por stuart uso de esta información.

## 2020-04-20 NOTE — TELEPHONE ENCOUNTER
I called patient to let her know that her MFM appt for ultrasound is scheduled for Thursday, April 30,2020 at 8:30 am at [facility]. She is aware.

## 2020-04-21 LAB
BACTERIA SPEC CULT: NORMAL
DEPRECATED HGB OTHER BLD-IMP: 0 %
HGB A MFR BLD: 97.7 % (ref 96.4–98.8)
HGB A2 MFR BLD COLUMN CHROM: 2.3 % (ref 1.8–3.2)
HGB C MFR BLD: 0 %
HGB F MFR BLD: 0 % (ref 0–2)
HGB FRACT BLD-IMP: NORMAL
HGB S BLD QL SOLY: NEGATIVE
HGB S MFR BLD: 0 %
HIV 1+2 AB+HIV1 P24 AG SERPL QL IA: NONREACTIVE
HIV12 RESULT COMMENT, HHIVC: NORMAL
RPR SER QL: NONREACTIVE
SERVICE CMNT-IMP: NORMAL
VZV IGG SER IA-ACNC: <135 INDEX

## 2020-04-22 LAB
C TRACH RRNA SPEC QL NAA+PROBE: NEGATIVE
N GONORRHOEA RRNA SPEC QL NAA+PROBE: NEGATIVE
SPECIMEN SOURCE: NORMAL
T VAGINALIS RRNA SPEC QL NAA+PROBE: NEGATIVE

## 2020-04-22 NOTE — PROGRESS NOTES
PNL: O+, Ab screen neg, Hgb fract wnl. Hgb 11.1, plt 144 c/w mild gestational thrombocytopenia. VZV non-immune. Rubella immune. RPR, HIV, HepBsAg neg. TSH 2.42. CMP wnl. Uprot 21; Uprot:Cr 0.2. UCx NG.

## 2020-04-30 ENCOUNTER — HOSPITAL ENCOUNTER (OUTPATIENT)
Dept: PERINATAL CARE | Age: 25
Discharge: HOME | End: 2020-04-30
Attending: OBSTETRICS & GYNECOLOGY
Payer: SELF-PAY

## 2020-04-30 PROCEDURE — 99204 OFFICE O/P NEW MOD 45 MIN: CPT | Performed by: OBSTETRICS & GYNECOLOGY

## 2020-04-30 PROCEDURE — 76805 OB US >/= 14 WKS SNGL FETUS: CPT | Performed by: OBSTETRICS & GYNECOLOGY

## 2020-05-04 ENCOUNTER — DOCUMENTATION ONLY (OUTPATIENT)
Dept: FAMILY MEDICINE CLINIC | Age: 25
End: 2020-05-04

## 2020-05-04 NOTE — PROGRESS NOTES
Cell free fetal DNA low risk. 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 Counseled by [facility] genetics today.     Rivka Cleary MD  Family Medicine Resident

## 2020-05-11 ENCOUNTER — TELEPHONE (OUTPATIENT)
Dept: FAMILY MEDICINE CLINIC | Age: 25
End: 2020-05-11

## 2020-05-11 NOTE — TELEPHONE ENCOUNTER
MICHEAL GOTTLIEB (OTHER)  Home Phone: [phone]  (speaks [place])     called to rescheduled the prenatal appointment of today.

## 2020-05-13 ENCOUNTER — TELEPHONE (OUTPATIENT)
Dept: FAMILY MEDICINE CLINIC | Age: 25
End: 2020-05-13

## 2020-05-13 ENCOUNTER — ROUTINE PRENATAL (OUTPATIENT)
Dept: FAMILY MEDICINE CLINIC | Age: 25
End: 2020-05-13

## 2020-05-13 ENCOUNTER — HOSPITAL ENCOUNTER (OUTPATIENT)
Dept: LAB | Age: 25
Discharge: HOME | End: 2020-05-13

## 2020-05-13 VITALS
TEMPERATURE: 98.1 F | RESPIRATION RATE: 17 BRPM | OXYGEN SATURATION: 99 % | WEIGHT: 160.4 LBS | DIASTOLIC BLOOD PRESSURE: 67 MMHG | HEART RATE: 93 BPM | SYSTOLIC BLOOD PRESSURE: 102 MMHG | BODY MASS INDEX: 30.29 KG/M2 | HEIGHT: 61 IN

## 2020-05-13 DIAGNOSIS — Z34.90 PRENATAL CARE, ANTEPARTUM: ICD-10-CM

## 2020-05-13 DIAGNOSIS — Z34.90 PRENATAL CARE, ANTEPARTUM: Primary | ICD-10-CM

## 2020-05-13 LAB
BILIRUB UR QL STRIP: NEGATIVE
GLUCOSE 1H P 100 G GLC PO SERPL-MCNC: 139 MG/DL (ref 65–140)
GLUCOSE UR-MCNC: NEGATIVE MG/DL
KETONES P FAST UR STRIP-MCNC: NEGATIVE MG/DL
PH UR STRIP: 7 [PH] (ref 4.6–8)
PROT UR QL STRIP: NEGATIVE
SP GR UR STRIP: 1.01 (ref 1–1.03)
UA UROBILINOGEN AMB POC: NORMAL (ref 0.2–1)
URINALYSIS CLARITY POC: NORMAL
URINALYSIS COLOR POC: YELLOW
URINE BLOOD POC: NORMAL
URINE LEUKOCYTES POC: NEGATIVE
URINE NITRITES POC: NEGATIVE

## 2020-05-13 NOTE — TELEPHONE ENCOUNTER
----- Message from Ricke Dandy sent at 5/13/2020 10:42 AM EDT -----  Regarding: Dr. Radha Espinosa General Message/Vendor Calls    Caller's first and last name: Lakeisha Jalloh  ()      Reason for call: Regarding his wife needing to have a test done.        Call back required yes/no and why: Yes       Best contact number(s): [phone]      Details to clarify the request:      Ricke Dandy

## 2020-05-13 NOTE — PROGRESS NOTES
Return OB Visit       Subjective:   Bolivar Collier 30 y.o. Tiffanie Amezcua   ROSA: 8/8/2020, by Last Menstrual Period  GA:  27w4d. [employer] : [id number]    LOF: none  Vaginal bleeding: none  Fetal movement: present  Contractions: none  PNV: taking w/ ASA    Allergies- reviewed:   No Known Allergies  Medications- reviewed:   Current Outpatient Medications   Medication Sig    PNV No12-Iron-FA-DSS-OM-3 29 mg iron-1 mg -50 mg CPKD Take  by mouth.  aspirin delayed-release 81 mg tablet Take 1 Tab by mouth daily.  ibuprofen (IBU) 600 mg tablet Take 1 Tab by mouth every six (6) hours as needed for Pain. [place] 1 pastilla cada 6 horas si necissita     No current facility-administered medications for this visit. Past Medical History- reviewed:  No past medical history on file. Past Surgical History- reviewed:   No past surgical history on file.   Social History- reviewed:  Social History     Socioeconomic History    Marital status: SINGLE     Spouse name: Not on file    Number of children: Not on file    Years of education: Not on file    Highest education level: Not on file   Occupational History    Not on file   Social Needs    Financial resource strain: Not on file    Food insecurity     Worry: Not on file     Inability: Not on file    Transportation needs     Medical: Not on file     Non-medical: Not on file   Tobacco Use    Smoking status: Never Smoker    Smokeless tobacco: Never Used   Substance and Sexual Activity    Alcohol use: No    Drug use: No    Sexual activity: Not on file   Lifestyle    Physical activity     Days per week: Not on file     Minutes per session: Not on file    Stress: Not on file   Relationships    Social connections     Talks on phone: Not on file     Gets together: Not on file     Attends Baptism service: Not on file     Active member of club or organization: Not on file     Attends meetings of clubs or organizations: Not on file     Relationship status: Not on file    Intimate partner violence     Fear of current or ex partner: Not on file     Emotionally abused: Not on file     Physically abused: Not on file     Forced sexual activity: Not on file   Other Topics Concern    Not on file   Social History Narrative    Not on file     Immunizations- reviewed:   Immunization History   Administered Date(s) Administered    Influenza Vaccine (Quad) PF 2018    Tdap 2020       Objective:     Visit Vitals  /67 (BP 1 Location: Right arm, BP Patient Position: Sitting)   Pulse 93   Temp 98.1 °F (36.7 °C) (Oral)   Resp 17   Ht 5' 0.63\" (1.54 m)   Wt 160 lb 6.4 oz (72.8 kg)   LMP 2019 (Exact Date)   SpO2 99%   BMI 30.68 kg/m²       Physical Exam:  GENERAL APPEARANCE: alert, well appearing, in no apparent distress  ABDOMEN: gravid, fundal height 26cm, FHT present at 125bpm  PSYCH: normal mood and affect    Labs  No results found for this or any previous visit (from the past 12 hour(s)). Assessment         ICD-10-CM ICD-9-CM    1. Prenatal care, antepartum Z34.90 V22.1 GLUCOSE, GESTATIONAL 1 HR TOLERANCE      AMB POC URINALYSIS DIP STICK AUTO W/O MICRO         Plan   30 y.o.  27w4d ROSA 2020, by Last Menstrual Period here for return OB visit     PNL: O+, Ab screen neg, Hgb fract wnl. Hgb 11.1, plt 144 c/w mild gestational thrombocytopenia. VZV non-immune. Rubella immune. RPR, HIV, HepBsAg neg. TSH 2.42. CMP wnl. Uprot 21; Uprot:Cr 0.2. UCx NG. GC/C/T neg. Pap NILM but no endocervical cells detected. Cell free fetal DNA low risk. 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 Counseled by [facility]. S/p Tdap    SIUP in 2nd Trimester  Pregnancy complicated by late to prenatal care, hx pre-eclampsia, MCAD carrier  · 1hr gtt today  · Hx Pre-E: CMP wnl. Uprot 21; Uprot:Cr 0.2. · Continue ASA 81mg  · Intermittent Palpitations: improved. Likely 2/2 pregnancy. HR wnl today. TSH wnl.   · MCAD Carrier:   · Counseled by genetics  · Pt's  to schedule a clinic appointment to get carrier testing  · Pt was under assumption she had hx PTD but this is not true based on [facility] delivery report she had her last baby at 37w2d  · Tdap given on 4/20 in [facility]or as it was ordered for a different pt. Will consider Tdap given and not order again at 28w. Pt informed of this medical error and reassured that this will not have negative effect on fetus. Pt verbalized understanding and did not have any further questions  · Follow up phone visits: 5/27, 6/10. 6/24. Clinic visit: 7/13. Orders Placed This Encounter    GLUCOSE, GESTATIONAL 1 HR TOLERANCE     Standing Status:   Future     Standing Expiration Date:   5/13/2021    AMB POC URINALYSIS DIP STICK AUTO W/O MICRO     Labor precautions discussed, including: Regular painful contractions, lasting for greater than one hour, taking your breath away; any vaginal bleeding; any leakage of fluid; or absent or decreased fetal movement. Call M.D. on call if any of these symptoms or signs occur. I have discussed the diagnosis with the patient and the intended plan as seen in the above orders. The patient has received an after-visit summary and questions were answered concerning future plans. I have discussed medication side effects and warnings with the patient as well. Informed pt to return to the office or go to the ER if she experiences vaginal bleeding, vaginal discharge, leaking of fluid, pelvic cramping.     Pt discussed with Dr. Frances Stoll (attending physician)    Lazarus Bally, MD  Family Medicine Resident

## 2020-05-13 NOTE — PROGRESS NOTES
I reviewed with the resident the medical history and the resident's findings on the physical examination. I discussed with the resident the patient's diagnosis and concur with the plan. 25yo  @ 27w4d by LMP=anatomy scan   1. IUP: RH pos, s/p tdap, GTT today   2. Late to care: at 24+ weeks  3. Hx PreE: ASA  4. Hx CS with 2 subsequent : desires TOLAC   5. Palpitations: will get EKG, check TSH   6.   Carrier of MCAD: seen by genetics,  is apparently being tested (will clarify)

## 2020-05-13 NOTE — PROGRESS NOTES
Chief Complaint   Patient presents with    Routine Prenatal Visit     27w 4d     1. Have you been to the ER, urgent care clinic since your last visit? Hospitalized since your last visit? No    2. Have you seen or consulted any other health care providers outside of the [address] [place] [address] since your last visit? Include any pap smears or colon screening.  No     Patient denies having any bleeding, cramping and spotting or leakage of fluid    Patient states that she feels fetal movement

## 2020-05-14 NOTE — PROGRESS NOTES
Estimated Date of Delivery: 20    48th%  normal anatomy  MCAD carrier   Seen by genetics FOB needs testing

## 2020-05-21 DIAGNOSIS — Z34.90 PREGNANT AND NOT YET DELIVERED, UNSPECIFIED TRIMESTER: Primary | ICD-10-CM

## 2020-05-26 ENCOUNTER — TELEPHONE (OUTPATIENT)
Dept: FAMILY MEDICINE CLINIC | Age: 25
End: 2020-05-26

## 2020-05-26 NOTE — PROGRESS NOTES
Rosemarie Means  24 y.o. female  1995  [address] [place] [address] [place] [address]  [id number]    [phone] (home)      [address]:    [facility] Telephone Encounter  Toya De Leon       Encounter Date: 2020 at 10:06 PM    Consent:  She and/or the health care decision maker is aware that this encounter will be billed as a routine OB appointment and that she may receive a bill for this telephone service, depending on her insurance coverage, and has provided verbal consent to proceed: Yes    Follow-up Prenatal     History of Present Illness     Contractions: no  LOF: no  Vaginal bleeding: no  Fetal movement: yes  Compliant with prenatal vitamins yes and is taking aspirin as well   BP: Pt has not been checking her BP, made aware to buy a BP machine and start checking her BP weekly     History   Patients past medical, surgical and family histories were personally reviewed and updated. Patient Active Problem List   Diagnosis Code    Accidental medication error T50.901A     Vitals/Objective:     General: Patient speaking in complete sentences without effort. Normal speech and cooperative. Due to this being a Virtual Check-in/Telephone evaluation, many elements of the physical examination are unable to be assessed. Assessment and Plan:   Rosemarie Means is a 30 y.o.  @ 29w4d evaluated by telephone. SIUP:  - Labs: O+, Ab screen neg, Hgb fract wnl. Hgb 11.1, plt 144 c/w mild gestational thrombocytopenia. VZV non-immune. Rubella immune. RPR, HIV, HepBsAg neg. Elevated 1 hr GTT --> 3 hr GTT scheduled   - Genetic screen: Cell free fetal DNA low risk. Expecting male  - Immunizations: S/P TDAP  - Ultrasound: Normal anatomy     Pregnancy Concerns: Hx Pre-E: CMP wnl. Uprot 21; Uprot:Cr 0.2. · Continue ASA 81mg  Intermittent Palpitations: improved. Likely 2/2 pregnancy. TSH wnl.   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 Frances Stoll- this next appointment might change based on her 3 hr GTT results   - Advised to come in sooner for any concerns  - Pt informed that after 28 wk she will be asked to do weekly home BP monitoring and it was recommended she buy or borrow a cuff ahead of time. One BP should be checked weekly and written in a log >28 weeks. - Patient educated on kick counts (after 28 weeks): baby should move 10X in 2 hours (can be a kick, roll, flutter, swish). - Patient was reminded about social distancing and to avoid going into public when possible. Patient understands that this encounter was a temporary measure, and the importance of further follow up and examination was emphasized. Patient verbalized understanding. I affirm this is a Patient Initiated Episode with an Established Patient who has not had a related appointment within my department in the past 7 days or scheduled within the next 24 hours. Note: not billable if this call serves to triage the patient into an appointment for the relevant concern      Electronically Signed: Chidi Nolan DO    Providers location when delivering service: home     No diagnosis found. Pursuant to the emergency declaration under the [facility] waiver authority and the [employer] Act, this Virtual  Visit was conducted, with patient's consent, to reduce the patient's risk of exposure to COVID-19 and provide continuity of care for an established patient.         Current Medications/Allergies   Medications and Allergies reviewed:    Current Outpatient Medications   Medication Sig Dispense Refill    PNV No12-Iron-FA-DSS-OM-3 29 mg iron-1 mg -50 mg CPKD Take  by mouth.  aspirin delayed-release 81 mg tablet Take 1 Tab by mouth daily. 90 Tab 1    ibuprofen (IBU) 600 mg tablet Take 1 Tab by mouth every six (6) hours as needed for Pain.  [place] 1 pastilla cada 6 horas si necissita 60 Tab 0     No Known Allergies

## 2020-05-26 NOTE — TELEPHONE ENCOUNTER
I called patient to let her know that Dr. Rogers Davis wanted her to comeback to do the 3 hour GTT Test because she failed the 1 hour. I scheduled her appointment for May 28,2020, Thursday.

## 2020-05-27 ENCOUNTER — ROUTINE PRENATAL (OUTPATIENT)
Dept: FAMILY MEDICINE CLINIC | Age: 25
End: 2020-05-27

## 2020-05-27 DIAGNOSIS — Z34.90 PRENATAL CARE, ANTEPARTUM: ICD-10-CM

## 2020-05-27 DIAGNOSIS — Z87.59 HISTORY OF PRE-ECLAMPSIA: ICD-10-CM

## 2020-05-27 DIAGNOSIS — Z3A.29 29 WEEKS GESTATION OF PREGNANCY: Primary | ICD-10-CM

## 2020-05-28 ENCOUNTER — TELEPHONE (OUTPATIENT)
Dept: FAMILY MEDICINE CLINIC | Age: 25
End: 2020-05-28

## 2020-05-28 NOTE — TELEPHONE ENCOUNTER
Patient  to request his wife medical records be sent to another practice. He was informed to reach out to that office to have them send us a medical release.

## 2020-06-09 ENCOUNTER — TELEPHONE (OUTPATIENT)
Dept: FAMILY MEDICINE CLINIC | Age: 25
End: 2020-06-09

## 2020-06-09 NOTE — TELEPHONE ENCOUNTER
[address] Dept calling to speak back to the nurse she was speaking to a few minutes ago.     Nurse Elba Oglesby took call

## 2020-06-11 ENCOUNTER — ROUTINE PRENATAL (OUTPATIENT)
Dept: FAMILY MEDICINE CLINIC | Age: 25
End: 2020-06-11

## 2020-06-11 ENCOUNTER — TELEPHONE (OUTPATIENT)
Dept: FAMILY MEDICINE CLINIC | Age: 25
End: 2020-06-11

## 2020-06-11 DIAGNOSIS — Z3A.31 31 WEEKS GESTATION OF PREGNANCY: Primary | ICD-10-CM

## 2020-06-11 DIAGNOSIS — Z87.59 HISTORY OF PRE-ECLAMPSIA: ICD-10-CM

## 2020-06-11 NOTE — TELEPHONE ENCOUNTER
Chavezund Mark  30 y.o. female  1995  [address] [address]  [id number]    [phone] (home)      [address]:    [facility] Telephone Encounter  Aman Baxter MD       Encounter Date: 6/11/2020 at 9:48 AM    3 attempts were made to patients phone and 2 attempts to emergency contact regarding prenatal visit by phone today. Unable to leave voice message on both numbers.   Will reschedule patients appointment    Aman Baxter MD  Family Medicine Resident  PGY-3

## 2020-06-15 ENCOUNTER — ROUTINE PRENATAL (OUTPATIENT)
Dept: FAMILY MEDICINE CLINIC | Age: 25
End: 2020-06-15

## 2020-06-15 DIAGNOSIS — Z3A.32 32 WEEKS GESTATION OF PREGNANCY: Primary | ICD-10-CM

## 2020-06-15 DIAGNOSIS — O99.810 ABNORMAL GLUCOSE TOLERANCE TEST (GTT) DURING PREGNANCY, ANTEPARTUM: ICD-10-CM

## 2020-06-15 NOTE — PROGRESS NOTES
Attempted to call pt 3 times. No answer for phone encounter. Next appointment is 6/24/20 at 11:15a.      Of note, pt has an abnormal 1hr OGTT and has not come to have 3 hour OGTT test done.    -Her next appointment: 6/24/2020 with Dr Gomes via telephone in AM.

## 2020-06-24 ENCOUNTER — ROUTINE PRENATAL (OUTPATIENT)
Dept: FAMILY MEDICINE CLINIC | Age: 25
End: 2020-06-24

## 2020-06-24 DIAGNOSIS — Z3A.33 33 WEEKS GESTATION OF PREGNANCY: Primary | ICD-10-CM

## 2022-03-19 PROBLEM — T50.901A ACCIDENTAL MEDICATION ERROR: Status: ACTIVE | Noted: 2020-04-20

## 2023-05-22 RX ORDER — ASPIRIN 81 MG/1
81 TABLET ORAL DAILY
COMMUNITY
Start: 2020-04-20

## 2023-05-22 RX ORDER — IBUPROFEN 600 MG/1
600 TABLET ORAL EVERY 6 HOURS PRN
COMMUNITY
Start: 2018-05-18